# Patient Record
Sex: FEMALE | Race: WHITE | URBAN - METROPOLITAN AREA
[De-identification: names, ages, dates, MRNs, and addresses within clinical notes are randomized per-mention and may not be internally consistent; named-entity substitution may affect disease eponyms.]

---

## 2017-07-05 DIAGNOSIS — N18.5 CHRONIC KIDNEY DISEASE, STAGE V (H): Primary | ICD-10-CM

## 2017-07-10 ENCOUNTER — HOSPITAL ENCOUNTER (OUTPATIENT)
Dept: LAB | Facility: CLINIC | Age: 82
Discharge: HOME | End: 2017-07-10
Admitting: NURSE PRACTITIONER
Payer: MEDICARE

## 2017-07-10 DIAGNOSIS — N18.5 CHRONIC KIDNEY DISEASE, STAGE V (H): ICD-10-CM

## 2017-07-10 LAB
ALBUMIN SERPL-MCNC: 3.4 G/DL (ref 3.4–5)
ANION GAP SERPL CALCULATED.3IONS-SCNC: 8 MMOL/L (ref 3–14)
BUN SERPL-MCNC: 69 MG/DL (ref 7–30)
CALCIUM SERPL-MCNC: 9 MG/DL (ref 8.5–10.1)
CHLORIDE SERPL-SCNC: 104 MMOL/L (ref 94–109)
CO2 SERPL-SCNC: 24 MMOL/L (ref 20–32)
CREAT SERPL-MCNC: 4.13 MG/DL (ref 0.52–1.04)
ERYTHROCYTE [DISTWIDTH] IN BLOOD BY AUTOMATED COUNT: 14.3 % (ref 10–15)
GFR SERPL CREATININE-BSD FRML MDRD: 10 ML/MIN/1.7M2
GLUCOSE SERPL-MCNC: 71 MG/DL (ref 70–99)
HCT VFR BLD AUTO: 26.3 % (ref 35–47)
HGB BLD-MCNC: 8.4 G/DL (ref 11.7–15.7)
MCH RBC QN AUTO: 31.5 PG (ref 26.5–33)
MCHC RBC AUTO-ENTMCNC: 31.9 G/DL (ref 31.5–36.5)
MCV RBC AUTO: 99 FL (ref 78–100)
PHOSPHATE SERPL-MCNC: 4.4 MG/DL (ref 2.5–4.5)
PLATELET # BLD AUTO: 144 10E9/L (ref 150–450)
POTASSIUM SERPL-SCNC: 4 MMOL/L (ref 3.4–5.3)
RBC # BLD AUTO: 2.67 10E12/L (ref 3.8–5.2)
SODIUM SERPL-SCNC: 136 MMOL/L (ref 133–144)
WBC # BLD AUTO: 5.6 10E9/L (ref 4–11)

## 2017-07-10 PROCEDURE — 80069 RENAL FUNCTION PANEL: CPT | Performed by: NURSE PRACTITIONER

## 2017-07-10 PROCEDURE — 36415 COLL VENOUS BLD VENIPUNCTURE: CPT | Performed by: NURSE PRACTITIONER

## 2017-07-10 PROCEDURE — 85027 COMPLETE CBC AUTOMATED: CPT | Performed by: NURSE PRACTITIONER

## 2017-08-22 ENCOUNTER — HOSPITAL ENCOUNTER (OUTPATIENT)
Facility: CLINIC | Age: 82
Setting detail: OBSERVATION
Discharge: HOME | End: 2017-08-24
Attending: EMERGENCY MEDICINE | Admitting: INTERNAL MEDICINE
Payer: MEDICARE

## 2017-08-22 ENCOUNTER — APPOINTMENT (OUTPATIENT)
Dept: GENERAL RADIOLOGY | Facility: CLINIC | Age: 82
End: 2017-08-22
Attending: EMERGENCY MEDICINE
Payer: MEDICARE

## 2017-08-22 DIAGNOSIS — I48.91 ATRIAL FIBRILLATION WITH SLOW VENTRICULAR RESPONSE (H): ICD-10-CM

## 2017-08-22 DIAGNOSIS — N18.5 CKD (CHRONIC KIDNEY DISEASE) STAGE 5, GFR LESS THAN 15 ML/MIN (H): ICD-10-CM

## 2017-08-22 DIAGNOSIS — I10 BENIGN ESSENTIAL HYPERTENSION: ICD-10-CM

## 2017-08-22 DIAGNOSIS — R00.1 SYMPTOMATIC BRADYCARDIA: Primary | ICD-10-CM

## 2017-08-22 LAB
ALBUMIN SERPL-MCNC: 3.4 G/DL (ref 3.4–5)
ALP SERPL-CCNC: 85 U/L (ref 40–150)
ALT SERPL W P-5'-P-CCNC: 21 U/L (ref 0–50)
ANION GAP SERPL CALCULATED.3IONS-SCNC: 11 MMOL/L (ref 3–14)
ANION GAP SERPL CALCULATED.3IONS-SCNC: 11 MMOL/L (ref 6–17)
AST SERPL W P-5'-P-CCNC: 29 U/L (ref 0–45)
BASOPHILS # BLD AUTO: 0 10E9/L (ref 0–0.2)
BASOPHILS NFR BLD AUTO: 0.3 %
BILIRUB SERPL-MCNC: 0.3 MG/DL (ref 0.2–1.3)
BUN SERPL-MCNC: 51 MG/DL (ref 7–30)
BUN SERPL-MCNC: 55 MG/DL (ref 7–30)
CA-I BLD-SCNC: 4.9 MG/DL (ref 4.4–5.2)
CA-I SERPL ISE-MCNC: 4.9 MG/DL (ref 4.4–5.2)
CALCIUM SERPL-MCNC: 9.1 MG/DL (ref 8.5–10.1)
CHLORIDE BLD-SCNC: 101 MMOL/L (ref 94–109)
CHLORIDE SERPL-SCNC: 101 MMOL/L (ref 94–109)
CO2 BLD-SCNC: 22 MMOL/L (ref 20–32)
CO2 BLDCOV-SCNC: 22 MMOL/L (ref 21–28)
CO2 SERPL-SCNC: 24 MMOL/L (ref 20–32)
CREAT BLD-MCNC: 4.1 MG/DL (ref 0.52–1.04)
CREAT SERPL-MCNC: 3.88 MG/DL (ref 0.52–1.04)
DIFFERENTIAL METHOD BLD: ABNORMAL
EOSINOPHIL # BLD AUTO: 0.1 10E9/L (ref 0–0.7)
EOSINOPHIL NFR BLD AUTO: 1.8 %
ERYTHROCYTE [DISTWIDTH] IN BLOOD BY AUTOMATED COUNT: 14.6 % (ref 10–15)
GFR SERPL CREATININE-BSD FRML MDRD: 10 ML/MIN/1.7M2
GFR SERPL CREATININE-BSD FRML MDRD: 11 ML/MIN/1.7M2
GLUCOSE BLD-MCNC: 95 MG/DL (ref 70–99)
GLUCOSE SERPL-MCNC: 97 MG/DL (ref 70–99)
HCT VFR BLD AUTO: 30 % (ref 35–47)
HCT VFR BLD CALC: 33 %PCV (ref 35–47)
HGB BLD CALC-MCNC: 11.2 G/DL (ref 11.7–15.7)
HGB BLD-MCNC: 9.8 G/DL (ref 11.7–15.7)
IMM GRANULOCYTES # BLD: 0.1 10E9/L (ref 0–0.4)
IMM GRANULOCYTES NFR BLD: 0.8 %
LACTATE BLD-SCNC: 2 MMOL/L (ref 0.7–2.1)
LYMPHOCYTES # BLD AUTO: 2.7 10E9/L (ref 0.8–5.3)
LYMPHOCYTES NFR BLD AUTO: 39.9 %
MCH RBC QN AUTO: 32.3 PG (ref 26.5–33)
MCHC RBC AUTO-ENTMCNC: 32.7 G/DL (ref 31.5–36.5)
MCV RBC AUTO: 99 FL (ref 78–100)
MONOCYTES # BLD AUTO: 0.5 10E9/L (ref 0–1.3)
MONOCYTES NFR BLD AUTO: 8.1 %
NEUTROPHILS # BLD AUTO: 3.3 10E9/L (ref 1.6–8.3)
NEUTROPHILS NFR BLD AUTO: 48.1 %
NRBC # BLD AUTO: 0.1 10*3/UL
NRBC BLD AUTO-RTO: 1 /100
PCO2 BLDV: 37 MM HG (ref 40–50)
PH BLDV: 7.39 PH (ref 7.32–7.43)
PLATELET # BLD AUTO: 173 10E9/L (ref 150–450)
PO2 BLDV: 42 MM HG (ref 25–47)
POTASSIUM BLD-SCNC: 4.2 MMOL/L (ref 3.4–5.3)
POTASSIUM SERPL-SCNC: 4.2 MMOL/L (ref 3.4–5.3)
PROT SERPL-MCNC: 7.1 G/DL (ref 6.8–8.8)
RBC # BLD AUTO: 3.03 10E12/L (ref 3.8–5.2)
SAO2 % BLDV FROM PO2: 77 %
SODIUM BLD-SCNC: 134 MMOL/L (ref 133–144)
SODIUM SERPL-SCNC: 136 MMOL/L (ref 133–144)
TROPONIN I BLD-MCNC: 0.01 UG/L (ref 0–0.1)
TROPONIN I SERPL-MCNC: <0.015 UG/L (ref 0–0.04)
WBC # BLD AUTO: 6.6 10E9/L (ref 4–11)

## 2017-08-22 PROCEDURE — 80047 BASIC METABLC PNL IONIZED CA: CPT

## 2017-08-22 PROCEDURE — 84484 ASSAY OF TROPONIN QUANT: CPT | Performed by: EMERGENCY MEDICINE

## 2017-08-22 PROCEDURE — 25000128 H RX IP 250 OP 636: Performed by: EMERGENCY MEDICINE

## 2017-08-22 PROCEDURE — 99285 EMERGENCY DEPT VISIT HI MDM: CPT

## 2017-08-22 PROCEDURE — 85014 HEMATOCRIT: CPT

## 2017-08-22 PROCEDURE — 85025 COMPLETE CBC W/AUTO DIFF WBC: CPT | Performed by: EMERGENCY MEDICINE

## 2017-08-22 PROCEDURE — 84484 ASSAY OF TROPONIN QUANT: CPT | Mod: 91

## 2017-08-22 PROCEDURE — 96376 TX/PRO/DX INJ SAME DRUG ADON: CPT

## 2017-08-22 PROCEDURE — 71010 XR CHEST PORT 1 VW: CPT

## 2017-08-22 PROCEDURE — 93005 ELECTROCARDIOGRAM TRACING: CPT

## 2017-08-22 PROCEDURE — 99223 1ST HOSP IP/OBS HIGH 75: CPT | Mod: AI | Performed by: INTERNAL MEDICINE

## 2017-08-22 PROCEDURE — 83605 ASSAY OF LACTIC ACID: CPT

## 2017-08-22 PROCEDURE — 82803 BLOOD GASES ANY COMBINATION: CPT

## 2017-08-22 PROCEDURE — 82330 ASSAY OF CALCIUM: CPT | Performed by: EMERGENCY MEDICINE

## 2017-08-22 PROCEDURE — 96374 THER/PROPH/DIAG INJ IV PUSH: CPT

## 2017-08-22 PROCEDURE — 80053 COMPREHEN METABOLIC PANEL: CPT | Performed by: EMERGENCY MEDICINE

## 2017-08-22 PROCEDURE — 96375 TX/PRO/DX INJ NEW DRUG ADDON: CPT

## 2017-08-22 RX ORDER — ONDANSETRON 2 MG/ML
4 INJECTION INTRAMUSCULAR; INTRAVENOUS ONCE
Status: COMPLETED | OUTPATIENT
Start: 2017-08-22 | End: 2017-08-22

## 2017-08-22 RX ORDER — DILTIAZEM HYDROCHLORIDE 360 MG/1
360 CAPSULE, EXTENDED RELEASE ORAL DAILY
Status: ON HOLD | COMMUNITY
End: 2017-08-24

## 2017-08-22 RX ORDER — MULTIPLE VITAMINS W/ MINERALS TAB 9MG-400MCG
1 TAB ORAL DAILY
COMMUNITY

## 2017-08-22 RX ORDER — ATROPINE SULFATE 0.1 MG/ML
INJECTION INTRAVENOUS
Status: DISCONTINUED
Start: 2017-08-22 | End: 2017-08-23 | Stop reason: HOSPADM

## 2017-08-22 RX ADMIN — ONDANSETRON 4 MG: 2 INJECTION INTRAMUSCULAR; INTRAVENOUS at 22:26

## 2017-08-22 RX ADMIN — ATROPINE SULFATE 0.5 MG: 0.4 INJECTION, SOLUTION INTRAMUSCULAR; INTRAVENOUS; SUBCUTANEOUS at 20:35

## 2017-08-22 RX ADMIN — ONDANSETRON 4 MG: 2 INJECTION INTRAMUSCULAR; INTRAVENOUS at 20:16

## 2017-08-22 RX ADMIN — ATROPINE SULFATE 0.5 MG: 0.4 INJECTION, SOLUTION INTRAMUSCULAR; INTRAVENOUS; SUBCUTANEOUS at 20:10

## 2017-08-22 ASSESSMENT — ENCOUNTER SYMPTOMS
BACK PAIN: 0
VOMITING: 0
FATIGUE: 1
CONFUSION: 1
ABDOMINAL PAIN: 0
NAUSEA: 1
SHORTNESS OF BREATH: 1

## 2017-08-22 NOTE — Clinical Note
Admitting Physician: ROHIT WOLFF [[id number]]   [id number]   Bed Type: Cardiac Telemetry [44]   Special needs: Fall Risk [8]   Special needs: Recommend Lift [6]   Bed request comments: Tele unit bed requested @[id number]

## 2017-08-22 NOTE — IP AVS SNAPSHOT
[place] [address] [facility]    [address]    [facility] [address]    Phone:  [phone]    Fax:  [phone]                                       After Visit Summary   8/22/2017    Mary Boykin    MRN: [id number]           After Visit Summary Signature Page     I have received my discharge instructions, and my questions have been answered. I have discussed any challenges I see with this plan with the nurse or doctor.    ..........................................................................................................................................  Patient/Patient Representative Signature      ..........................................................................................................................................  Patient Representative Print Name and Relationship to Patient    ..................................................               ................................................  Date                                            Time    ..........................................................................................................................................  Reviewed by Signature/Title    ...................................................              ..............................................  Date                                                            Time

## 2017-08-22 NOTE — IP AVS SNAPSHOT
MRN:[id number]                      After Visit Summary   8/22/2017    Mary Boykin    MRN: [id number]           Thank you!     Thank you for choosing [facility] for your care. Our goal is always to provide you with excellent care. Hearing back from our patients is one way we can continue to improve our services. Please take a few minutes to complete the written survey that you may receive in the mail after you visit. If you would like to speak to someone directly about your visit please contact Patient Relations at [phone]. Thank you!          Patient Information     Date Of Birth          1935        Designated Caregiver       Most Recent Value    Caregiver    Will someone help with your care after discharge? yes    Name of designated caregiver Tammy    Phone number of caregiver [phone]    Caregiver address [address] [place] [address]      About your hospital stay     You were admitted on:  August 22, 2017 You last received care in the:  [facility]    You were discharged on:  August 24, 2017       Who to Call     For medical emergencies, please call 911.  For non-urgent questions about your medical care, please call your primary care provider or clinic, None          Attending Provider     Provider Specialty    Demetrice Quevedo MD Emergency Medicine    Oniel Doyle MD Internal Medicine    Vanessa Maurer MD Internal Medicine       Primary Care Provider    None Doctor, MD      After Care Instructions     Activity       Your activity upon discharge: activity as tolerated            Diet       Follow this diet upon discharge: Orders Placed This Encounter      Combination Diet Renal Diet                  Follow-up Appointments     Follow-up and recommended labs and tests        Follow up with primary care provider within 7 days for hospital follow- up.  The following labs/tests are recommended: bmp.                  Pending Results      "No orders found from 2017 to 2017.            Statement of Approval     Ordered          17 0957  I have reviewed and agree with all the recommendations and orders detailed in this document.  EFFECTIVE NOW     Approved and electronically signed by:  Vanessa Maurer MD             Admission Information     Date & Time Provider Department Dept. Phone    2017 Vanessa Maurer MD [place] [address] [facility] [phone]      Your Vitals Were     Blood Pressure Pulse Temperature Respirations Height Weight    160/68 (BP Location: Left arm) 68 97.7  F (36.5  C) (Oral) 16 1.6 m (5' 3\") 79.8 kg (176 lb)    Pulse Oximetry BMI (Body Mass Index)                95% 31.18 kg/m2          MyChart Information     nVoq lets you send messages to your doctor, view your test results, renew your prescriptions, schedule appointments and more. To sign up, go to [email or web address] . Click on \"Log in\" on the left side of the screen, which will take you to the Welcome page. Then click on \"Sign up Now\" on the right side of the page.     You will be asked to enter the access code listed below, as well as some personal information. Please follow the directions to create your username and password.     Your access code is: [email or web address]  Expires: 2017 10:04 AM     Your access code will  in 90 days. If you need help or a new code, please call your [facility] clinic or [phone].        Care EveryWhere ID     This is your Care EveryWhere ID. This could be used by other organizations to access your [facility] medical records  [id number]        Equal Access to Services     MONSE GILLIAM : Hadmo Osborn, isaiah mullen, ignacio parekh. So [facility] [phone].    ATENCIÓN: Si habla español, tiene a wyman disposición servicios gratuitos de asistencia lingüística. Llame al [phone].    We comply with applicable federal civil rights laws and [place] " laws. We do not discriminate on the basis of race, color, national origin, age, disability sex, sexual orientation or gender identity.               Review of your medicines      START taking        Dose / Directions    amLODIPine 5 MG tablet   Commonly known as:  NORVASC   Used for:  Benign essential hypertension        Dose:  5 mg   Start taking on:  8/25/2017   Take 1 tablet (5 mg) by mouth daily   Quantity:  30 tablet   Refills:  0       ondansetron 4 MG tablet   Commonly known as:  ZOFRAN        Dose:  4 mg   Take 1 tablet (4 mg) by mouth every 6 hours as needed for nausea   Quantity:  20 tablet   Refills:  0         CONTINUE these medicines which have NOT CHANGED        Dose / Directions    ALLOPURINOL PO        Dose:  100 mg   Take 100 mg by mouth daily   Refills:  0       DOXAZOSIN MESYLATE PO        Dose:  8 mg   Take 8 mg by mouth every evening   Refills:  0       HYDRALAZINE HCL PO        Dose:  10 mg   Take 10 mg by mouth 2 times daily   Refills:  0       LASIX PO        Dose:  40 mg   Take 40 mg by mouth 2 times daily   Refills:  0       Multi-vitamin Tabs tablet        Dose:  1 tablet   Take 1 tablet by mouth daily   Refills:  0       psyllium 0.52 G capsule        Dose:  1 capsule   Take 1 capsule by mouth daily   Refills:  0       ROSUVASTATIN CALCIUM PO        Dose:  10 mg   Take 10 mg by mouth every evening   Refills:  0       SYNTHROID PO        Dose:  25 mcg   Take 25 mcg by mouth daily   Refills:  0       VITAMIN D (CHOLECALCIFEROL) PO        Dose:  2000 Units   Take 2,000 Units by mouth daily   Refills:  0         STOP taking     CARDIZEM  MG 24 hr CD capsule   Generic drug:  diltiazem           METOPROLOL SUCCINATE ER PO                Where to get your medicines      These medications were sent to [facility]/pharmacy #[id number] - [place], [place] - [address] [facility]  [address] [facility] [address]     Phone:  [phone]     amLODIPine 5 MG tablet    ondansetron 4 MG tablet                 Protect others around you: Learn how to safely use, store and throw away your medicines at www.disposemymeds.org.             Medication List: This is a list of all your medications and when to take them. Check marks below indicate your daily home schedule. Keep this list as a reference.      Medications           Morning Afternoon Evening Bedtime As Needed    ALLOPURINOL PO   Take 100 mg by mouth daily   Last time this was given:  100 mg on 8/24/2017  8:23 AM                                amLODIPine 5 MG tablet   Commonly known as:  NORVASC   Take 1 tablet (5 mg) by mouth daily   Start taking on:  8/25/2017                                DOXAZOSIN MESYLATE PO   Take 8 mg by mouth every evening   Last time this was given:  8 mg on 8/23/2017  8:01 PM                                HYDRALAZINE HCL PO   Take 10 mg by mouth 2 times daily   Last time this was given:  10 mg on 8/24/2017  8:24 AM                                LASIX PO   Take 40 mg by mouth 2 times daily   Last time this was given:  40 mg on 8/24/2017  8:24 AM                                Multi-vitamin Tabs tablet   Take 1 tablet by mouth daily   Last time this was given:  1 tablet on 8/24/2017  8:24 AM                                ondansetron 4 MG tablet   Commonly known as:  ZOFRAN   Take 1 tablet (4 mg) by mouth every 6 hours as needed for nausea                                psyllium 0.52 G capsule   Take 1 capsule by mouth daily   Last time this was given:  0.52 g on 8/24/2017  8:24 AM                                ROSUVASTATIN CALCIUM PO   Take 10 mg by mouth every evening   Last time this was given:  10 mg on 8/23/2017  8:01 PM                                SYNTHROID PO   Take 25 mcg by mouth daily   Last time this was given:  25 mcg on 8/24/2017  8:24 AM                                VITAMIN D (CHOLECALCIFEROL) PO   Take 2,000 Units by mouth daily                                          More Information        Amlodipine tablets  What is  this medicine?  AMLODIPINE (janes lee peastrid) is a calcium-channel blocker. It affects the amount of calcium found in your heart and muscle cells. This relaxes your blood vessels, which can reduce the amount of work the heart has to do. This medicine is used to lower high blood pressure. It is also used to prevent chest pain.  How should I use this medicine?  Take this medicine by mouth with a glass of water. Follow the directions on the prescription label. Take your medicine at regular intervals. Do not take more medicine than directed.  Talk to your pediatrician regarding the use of this medicine in children. Special care may be needed. This medicine has been used in children as young as 6.  Persons over 65 years old may have a stronger reaction to this medicine and need smaller doses.  What side effects may I notice from receiving this medicine?  Side effects that you should report to your doctor or health care professional as soon as possible:    allergic reactions like skin rash, itching or hives, swelling of the face, lips, or tongue    breathing problems    changes in vision or hearing    chest pain    fast, irregular heartbeat    swelling of legs or ankles  Side effects that usually do not require medical attention (report to your doctor or health care professional if they continue or are bothersome):    dry mouth    facial flushing    nausea, vomiting    stomach gas, pain    tired, weak    trouble sleeping  What may interact with this medicine?    herbal or dietary supplements    local or general anesthetics    medicines for high blood pressure    medicines for prostate problems    rifampin  What if I miss a dose?  If you miss a dose, take it as soon as you can. If it is almost time for your next dose, take only that dose. Do not take double or extra doses.  Where should I keep my medicine?  Keep out of the reach of children.  Store at room temperature between 59 and 86 degrees F (15 and 30 degrees C).  Protect from light. Keep container tightly closed. Throw away any unused medicine after the expiration date.  What should I tell my health care provider before I take this medicine?  They need to know if you have any of these conditions:    heart problems like heart failure or aortic stenosis    liver disease    an unusual or allergic reaction to amlodipine, other medicines, foods, dyes, or preservatives    pregnant or trying to get pregnant    breast-feeding  What should I watch for while using this medicine?  Visit your doctor or health care professional for regular check ups. Check your blood pressure and pulse rate regularly. Ask your health care professional what your blood pressure and pulse rate should be, and when you should contact him or her.  This medicine may make you feel confused, dizzy or lightheaded. Do not drive, use machinery, or do anything that needs mental alertness until you know how this medicine affects you. To reduce the risk of dizzy or fainting spells, do not sit or stand up quickly, especially if you are an older patient. Avoid alcoholic drinks; they can make you more dizzy.  Do not suddenly stop taking amlodipine. Ask your doctor or health care professional how you can gradually reduce the dose.  NOTE:This sheet is a summary. It may not cover all possible information. If you have questions about this medicine, talk to your doctor, pharmacist, or health care provider. Copyright  2017 Gold Standard                Ondansetron oral dissolving tablet  What is this medicine?  ONDANSETRON (on DAN se cayden) is used to treat nausea and vomiting caused by chemotherapy. It is also used to prevent or treat nausea and vomiting after surgery.  How should I use this medicine?  These tablets are made to dissolve in the mouth. Do not try to push the tablet through the foil backing. With dry hands, peel away the foil backing and gently remove the tablet. Place the tablet in the mouth and allow it to  dissolve, then swallow. While you may take these tablets with water, it is not necessary to do so.  Talk to your pediatrician regarding the use of this medicine in children. Special care may be needed.  What side effects may I notice from receiving this medicine?  Side effects that you should report to your doctor or health care professional as soon as possible:    allergic reactions like skin rash, itching or hives, swelling of the face, lips, or tongue    breathing problems    confusion    dizziness    fast or irregular heartbeat    feeling faint or lightheaded, falls    fever and chills    loss of balance or coordination    seizures    sweating    swelling of the hands and feet    tightness in the chest    tremors    unusually weak or tired  Side effects that usually do not require medical attention (report to your doctor or health care professional if they continue or are bothersome):    constipation or diarrhea    headache  What may interact with this medicine?  Do not take this medicine with any of the following medications:    apomorphine    certain medicines for fungal infections like fluconazole, itraconazole, ketoconazole, posaconazole, voriconazole    cisapride    dofetilide    dronedarone    pimozide    thioridazine    ziprasidone  This medicine may also interact with the following medications:    carbamazepine    certain medicines for depression, anxiety, or psychotic disturbances    fentanyl    linezolid    MAOIs like Carbex, Eldepryl, Marplan, Nardil, and Parnate    methylene blue (injected into a vein)    other medicines that prolong the QT interval (cause an abnormal heart rhythm)    phenytoin    rifampicin    tramadol  What if I miss a dose?  If you miss a dose, take it as soon as you can. If it is almost time for your next dose, take only that dose. Do not take double or extra doses.  Where should I keep my medicine?  Keep out of the reach of children.  Store between 2 and 30 degrees C (36 and 86  degrees F). Throw away any unused medicine after the expiration date.  What should I tell my health care provider before I take this medicine?  They need to know if you have any of these conditions:    heart disease    history of irregular heartbeat    liver disease    low levels of magnesium or potassium in the blood    an unusual or allergic reaction to ondansetron, granisetron, other medicines, foods, dyes, or preservatives    pregnant or trying to get pregnant    breast-feeding  What should I watch for while using this medicine?  Check with your doctor or health care professional as soon as you can if you have any sign of an allergic reaction.  NOTE:This sheet is a summary. It may not cover all possible information. If you have questions about this medicine, talk to your doctor, pharmacist, or health care provider. Copyright  2017 Gold Standard

## 2017-08-23 PROBLEM — I48.20 CHRONIC ATRIAL FIBRILLATION (H): Status: ACTIVE | Noted: 2017-08-23

## 2017-08-23 PROBLEM — R00.1 BRADYCARDIA: Status: ACTIVE | Noted: 2017-08-23

## 2017-08-23 PROBLEM — I48.91 ATRIAL FIBRILLATION WITH SLOW VENTRICULAR RESPONSE (H): Status: ACTIVE | Noted: 2017-08-23

## 2017-08-23 PROBLEM — N18.5 CKD (CHRONIC KIDNEY DISEASE) STAGE 5, GFR LESS THAN 15 ML/MIN (H): Status: ACTIVE | Noted: 2017-08-23

## 2017-08-23 LAB
INTERPRETATION ECG - MUSE: NORMAL
INTERPRETATION ECG - MUSE: NORMAL
T4 FREE SERPL-MCNC: 0.99 NG/DL (ref 0.76–1.46)
TSH SERPL DL<=0.005 MIU/L-ACNC: 5.63 MU/L (ref 0.4–4)

## 2017-08-23 PROCEDURE — 84439 ASSAY OF FREE THYROXINE: CPT | Performed by: INTERNAL MEDICINE

## 2017-08-23 PROCEDURE — 84443 ASSAY THYROID STIM HORMONE: CPT | Performed by: INTERNAL MEDICINE

## 2017-08-23 PROCEDURE — 25000125 ZZHC RX 250: Performed by: INTERNAL MEDICINE

## 2017-08-23 PROCEDURE — A9270 NON-COVERED ITEM OR SERVICE: HCPCS | Mod: GY | Performed by: INTERNAL MEDICINE

## 2017-08-23 PROCEDURE — 99207 ZZC CDG-CODE CATEGORY CHANGED: CPT | Performed by: INTERNAL MEDICINE

## 2017-08-23 PROCEDURE — 36415 COLL VENOUS BLD VENIPUNCTURE: CPT | Performed by: INTERNAL MEDICINE

## 2017-08-23 PROCEDURE — 25000128 H RX IP 250 OP 636: Performed by: INTERNAL MEDICINE

## 2017-08-23 PROCEDURE — 25000132 ZZH RX MED GY IP 250 OP 250 PS 637: Mod: GY | Performed by: INTERNAL MEDICINE

## 2017-08-23 PROCEDURE — 99225 ZZC SUBSEQUENT OBSERVATION CARE,LEVEL II: CPT | Performed by: INTERNAL MEDICINE

## 2017-08-23 PROCEDURE — G0378 HOSPITAL OBSERVATION PER HR: HCPCS

## 2017-08-23 RX ORDER — ALLOPURINOL 100 MG/1
100 TABLET ORAL DAILY
Status: DISCONTINUED | OUTPATIENT
Start: 2017-08-23 | End: 2017-08-24 | Stop reason: HOSPADM

## 2017-08-23 RX ORDER — POLYETHYLENE GLYCOL 3350 17 G/17G
17 POWDER, FOR SOLUTION ORAL DAILY PRN
Status: DISCONTINUED | OUTPATIENT
Start: 2017-08-23 | End: 2017-08-24 | Stop reason: HOSPADM

## 2017-08-23 RX ORDER — ONDANSETRON 4 MG/1
4 TABLET, ORALLY DISINTEGRATING ORAL EVERY 6 HOURS PRN
Status: DISCONTINUED | OUTPATIENT
Start: 2017-08-23 | End: 2017-08-24 | Stop reason: HOSPADM

## 2017-08-23 RX ORDER — MULTIPLE VITAMINS W/ MINERALS TAB 9MG-400MCG
1 TAB ORAL DAILY
Status: DISCONTINUED | OUTPATIENT
Start: 2017-08-23 | End: 2017-08-24 | Stop reason: HOSPADM

## 2017-08-23 RX ORDER — ONDANSETRON 2 MG/ML
4 INJECTION INTRAMUSCULAR; INTRAVENOUS EVERY 6 HOURS PRN
Status: DISCONTINUED | OUTPATIENT
Start: 2017-08-23 | End: 2017-08-24 | Stop reason: HOSPADM

## 2017-08-23 RX ORDER — PROCHLORPERAZINE MALEATE 5 MG
5 TABLET ORAL EVERY 6 HOURS PRN
Status: DISCONTINUED | OUTPATIENT
Start: 2017-08-23 | End: 2017-08-24 | Stop reason: HOSPADM

## 2017-08-23 RX ORDER — AMOXICILLIN 250 MG
1-2 CAPSULE ORAL 2 TIMES DAILY PRN
Status: DISCONTINUED | OUTPATIENT
Start: 2017-08-23 | End: 2017-08-24 | Stop reason: HOSPADM

## 2017-08-23 RX ORDER — NALOXONE HYDROCHLORIDE 0.4 MG/ML
.1-.4 INJECTION, SOLUTION INTRAMUSCULAR; INTRAVENOUS; SUBCUTANEOUS
Status: DISCONTINUED | OUTPATIENT
Start: 2017-08-23 | End: 2017-08-23

## 2017-08-23 RX ORDER — BISACODYL 10 MG
10 SUPPOSITORY, RECTAL RECTAL DAILY PRN
Status: DISCONTINUED | OUTPATIENT
Start: 2017-08-23 | End: 2017-08-24 | Stop reason: HOSPADM

## 2017-08-23 RX ORDER — DOXAZOSIN 4 MG/1
8 TABLET ORAL EVERY EVENING
Status: DISCONTINUED | OUTPATIENT
Start: 2017-08-23 | End: 2017-08-24 | Stop reason: HOSPADM

## 2017-08-23 RX ORDER — HYDRALAZINE HYDROCHLORIDE 10 MG/1
10 TABLET, FILM COATED ORAL 2 TIMES DAILY
Status: DISCONTINUED | OUTPATIENT
Start: 2017-08-24 | End: 2017-08-24 | Stop reason: HOSPADM

## 2017-08-23 RX ORDER — NALOXONE HYDROCHLORIDE 0.4 MG/ML
.1-.4 INJECTION, SOLUTION INTRAMUSCULAR; INTRAVENOUS; SUBCUTANEOUS
Status: DISCONTINUED | OUTPATIENT
Start: 2017-08-23 | End: 2017-08-24 | Stop reason: HOSPADM

## 2017-08-23 RX ORDER — PROCHLORPERAZINE 25 MG
12.5 SUPPOSITORY, RECTAL RECTAL EVERY 12 HOURS PRN
Status: DISCONTINUED | OUTPATIENT
Start: 2017-08-23 | End: 2017-08-24 | Stop reason: HOSPADM

## 2017-08-23 RX ORDER — LEVOTHYROXINE SODIUM 25 UG/1
25 TABLET ORAL DAILY
Status: DISCONTINUED | OUTPATIENT
Start: 2017-08-23 | End: 2017-08-24 | Stop reason: HOSPADM

## 2017-08-23 RX ORDER — ROSUVASTATIN CALCIUM 5 MG/1
10 TABLET, COATED ORAL EVERY EVENING
Status: DISCONTINUED | OUTPATIENT
Start: 2017-08-23 | End: 2017-08-24 | Stop reason: HOSPADM

## 2017-08-23 RX ORDER — FUROSEMIDE 40 MG
40 TABLET ORAL
Status: DISCONTINUED | OUTPATIENT
Start: 2017-08-23 | End: 2017-08-24 | Stop reason: HOSPADM

## 2017-08-23 RX ORDER — ACETAMINOPHEN 325 MG/1
650 TABLET ORAL EVERY 4 HOURS PRN
Status: DISCONTINUED | OUTPATIENT
Start: 2017-08-23 | End: 2017-08-24 | Stop reason: HOSPADM

## 2017-08-23 RX ADMIN — ONDANSETRON 4 MG: 4 TABLET, ORALLY DISINTEGRATING ORAL at 12:36

## 2017-08-23 RX ADMIN — MULTIPLE VITAMINS W/ MINERALS TAB 1 TABLET: TAB at 08:01

## 2017-08-23 RX ADMIN — FUROSEMIDE 40 MG: 40 TABLET ORAL at 08:01

## 2017-08-23 RX ADMIN — LEVOTHYROXINE SODIUM 25 MCG: 25 TABLET ORAL at 08:02

## 2017-08-23 RX ADMIN — DOXAZOSIN 8 MG: 4 TABLET ORAL at 20:01

## 2017-08-23 RX ADMIN — PROCHLORPERAZINE EDISYLATE 5 MG: 5 INJECTION INTRAMUSCULAR; INTRAVENOUS at 00:31

## 2017-08-23 RX ADMIN — PSYLLIUM 0.52 G: 0.52 CAPSULE ORAL at 08:01

## 2017-08-23 RX ADMIN — FUROSEMIDE 40 MG: 40 TABLET ORAL at 16:20

## 2017-08-23 RX ADMIN — ROSUVASTATIN CALCIUM 10 MG: 5 TABLET, FILM COATED ORAL at 20:01

## 2017-08-23 RX ADMIN — ALLOPURINOL 100 MG: 100 TABLET ORAL at 08:01

## 2017-08-23 NOTE — ED PROVIDER NOTES
"  History     Chief Complaint:  Hypotension    HPI   Mary Boykin is a 81 year old female with a history of breast cancer and CKD who presents to the emergency department today for evaluation of hypotension. The patient's daughter reports she returned home from work today and checked the patient's blood pressure which was 99/43 with a pulse of 37. Per the patient's provider at [facility], if her systolic blood pressure drops below 100 to seek evaluation in the ED. Daughter notes the patient is confused and more lethargic today as well. Upon presentation, she endorses fatigue, nauseous, mild shortness of breath and her leg swelling is improved compared to her baseline. She is not on dialysis and \"will not be.\"  The patient is able to ambulate with a walker, but is unable to feel her legs after old spine surgery. She denies fevers, chest pain, vomiting, and any pain. She is DNR/DNI.     Allergies:  Atorvastatin     Medications:    ROSUVASTATIN CALCIUM PO  DOXAZOSIN MESYLATE PO  METOPROLOL SUCCINATE ER PO  HYDRALAZINE HCL PO    Past Medical History:    Heart failure  CKD  Spinal cord disease  Malignant neoplasm of breast  Hypertension  Anemia    Past Surgical History:    Simple mastectomy  Arthroplasty of knee  Phacoemulsification of cataract with intraocular lens implantation    Family History:    Cancer   Hyperlipidemia  Hypertension  Anemia  Arthritis    Social History:  The patient was accompanied to the ED by her family.  Smoking Status: Never  Smokeless Tobacco: Never  Alcohol Use: Yes  Marital Status:        Review of Systems   Constitutional: Positive for fatigue.   Respiratory: Positive for shortness of breath.    Cardiovascular: Positive for leg swelling. Negative for chest pain.   Gastrointestinal: Positive for nausea. Negative for abdominal pain and vomiting.   Musculoskeletal: Negative for back pain.   Psychiatric/Behavioral: Positive for confusion.   All other systems reviewed and are " "negative.    Physical Exam   First Vitals:  BP: 111/60  Pulse: (!) 42  Temp: 97.9  F (36.6  C)  Resp: 20  Height: 160 cm (5' 3\")  Weight: 77.1 kg (170 lb)  SpO2: 100 %      Physical Exam  Constitutional: Awake, fatigued appearing elderly woman, GCS 15, mild distress   HENT:    Nose: Nose normal.    Mouth/Throat: Oropharynx is clear, mucous membranes are moist   Eyes: Normal conjunctiva. Pupils are equal, round, and reactive to light.   CV: regular rate and rhythm; no murmurs, rubs or gallups  Chest: Effort normal and breath sounds normal.   GI:  There is no tenderness. No distension. Normal bowel sounds  MSK: Weakness in lower extremities, 3+ pitting edema bilaterally   Neurological: Alert, attentive, decreased sensation in legs (old postoperative injury), oriented x3  Skin: Skin is warm and dry.    Emergency Department Course     ECG:  Indication: Hypotension  Completed at 1949.  Read at 1950.   Atrial fibrillation with slow ventricular response with a competing junctional pacemaker  Nonspecific ST abnormality  Abnormal ECG   Rate 47 bpm. IL interval *. QRS duration 106. QT/QTc 516/456. P-R-T axes * 41 51.    Imaging:  Radiology findings were communicated with the patient and family who voiced understanding of the findings.  XR Chest Port 1 View   IMPRESSION:   1. No convincing evidence of active cardiopulmonary disease.  2. Relative prominence of the right hilum. This could relate to normal  pulmonary vessels, but an enlarged lymph node or a pulmonary mass  cannot be excluded. A nonportable two-view chest radiograph would be  useful for further evaluation when the patient is able.  Report per radiology     Laboratory:  Laboratory findings were communicated with the patient and family who voiced understanding of the findings.  Troponin (Collected 1950): <0.015  Troponin (Collected 2004): 0.01  Calcium ionized: 4.9  ISTAT gases lactate dayton POCT: pH Venous 7.39, PCO2 Venous 37(L), PO2 Venous 42, Bicarbonate Venous " 22, O2 Sat Venous 77, Lactic Acid 2.0  ISTAT Basic Met Ica HCT POCT: BUN 51(H), Creatinine 4.1(H), GFR Estimate 10(L), HGB 11.2(L), Hematocrit 33(L) o/w WNL  CBC: HGB 9.8(L) o/w WNL. (WBC 6.6,  )   CMP: Creatinine 3.88(H), BUN 55(H), GFR Estimate 11(L) o/w WNL     Interventions:   Atropine 0.5mg IV   Zofran 4mg IV   Atropine 0.5mg IV   Zofran 4mg IV     Emergency Department Course:  Nursing notes and vitals reviewed.  IV was inserted and blood was drawn for laboratory testing, results above.  The patient was sent for a XR Chest Port 1 View while in the emergency department, results above.   : I performed an exam of the patient as documented above.   : Patient rechecked and updated. I had a lengthy discussion with the patient and family regarding goals and treatment options.   :  I spoke with Dr. Doyle of the hospitalist service regarding patient's presentation, findings, and plan of care.  I discussed the treatment plan with the patient. They expressed understanding of this plan and consented to admission. I discussed the patient with Dr. Doyle, who will admit the patient to a monitored bed for further evaluation and treatment.  I personally reviewed the laboratory and imaging results with the Patient and spouse and daughter and answered all related questions prior to admission.    Impression & Plan      Medical Decision Makin year old female with CKD, presents with hypotension at home and fatigue.  Found to be bradycardic, with slow atrial fibrillation on EKG.  Differential diagnosis includes electrolyte abnormalities, medication side effect exacerbated by CKD, ingestion, CHF, ACS, pulmonary embolism.  She has not been hypotensive, but has had heart rate in 40-low 50s.  Improved temporarily with atropine.  Patient appears to have symptomatic bradycardia, has no history of atrial fibrillation, and her bradycardia may triggered by metoprolol build up with her CKD.  She is  DNR/DNI and told me she does not want any surgeries (including pacemaker placement) or to start dialysis.  Her goals are mostly for symptom treatment and medical management without large interventions.  I discussed multiple times what patient/family's goals of care are.  She and her  continued to state they would not want pacemaker and prefer to stay a [facility] for admission rather than transfer or ICU placement.  This appears to be in alignment with her wishes and family understands.  Patient is otherwise hemodynamically stable.  Troponin normal and lactate normal. Mild worsening of creatinine.  I discussed this with hospitalist who will admit the patient.      Diagnosis:    ICD-10-CM    1. Symptomatic bradycardia R00.1    2. Atrial fibrillation with slow ventricular response (H) I48.91    3. CKD (chronic kidney disease) stage 5, GFR less than 15 ml/min (H) N18.5      Disposition:  Admitted to cardiac telemetry    Scribe Disclosure:  Anna LE, am serving as a scribe at 7:46 PM on 8/22/2017 to document services personally performed by Demetrice Quevedo MD based on my observations and the provider's statements to me.     8/22/2017   [facility] EMERGENCY DEPARTMENT       Demetrice Quevedo MD  08/23/17 0016       Demetrice Quevedo MD  08/23/17 0018

## 2017-08-23 NOTE — ED NOTES
Patient states she has been feeling tired and weak for the last week since getting a shot at [place].  States they have talked dialysis for her kidney failure but she refuses to have that.  States she also has increased her Lasix lately and was told to be seen if her systolic was less than 100 and today it was.  Patient has moderate swelling to BLE's but states it looks better today and that it has been worse.  Noted to be bradycardic.  ABC's intact.  Placed on heart monitor, PIV placed, EKG obtained and BP cuff to be cycled every 15 minutes.  BP initially stable.  Does state intermittent shortness of breath.

## 2017-08-23 NOTE — PLAN OF CARE
HR merry 49-64, all other VSS.  A&Ox4, pivots with A2 and a walker.  Murmur present, LS expiratory wheezes.  Compazine given x1 for nausea/vomiting with good results.  Will continue with POC.

## 2017-08-23 NOTE — PHARMACY-ADMISSION MEDICATION HISTORY
Admission medication history interview status for this patient is complete. See [facility] admission navigator for allergy information, prior to admission medications and immunization status.     Medication history interview source(s):Patient and Family  Medication history resources (including written lists, pill bottles, clinic record):cellphone records    Changes made to PTA medication list:  Added: synthroid, allopurinol, lasix, MVI, Vitamin D, Metamucil, Diltiazem CD  Deleted: none  Changed: none    Actions taken by pharmacist (provider contacted, etc):called [facility] pharm [phone] to verify doses of synthroid, allopurinol, diltiazem     Additional medication history information:None    Medication reconciliation/reorder completed by provider prior to medication history? No    For patients on insulin therapy: no (Yes/No)   Lantus/levemir/NPH/Mix 70/30 dose: ___ in AM/PM or twice daily   Sliding scale Novolog Y/N   If Yes, do you have a baseline novolog pre-meal dose: ______units with meals   Patients eat three meals a day: Y/N ---  How many episodes of hypoglycemia (low blood glucose) do you have weekly: ---   How many missed doses do you have a week: ---  How many times do you check your blood glucose per day: ---  Any Barriers to therapy: cost of medications/comfortable with giving injections (if applicable)/ comfortable and confident with current diabetes regimen ---      Prior to Admission medications    Medication Sig Last Dose Taking? Auth Provider   ROSUVASTATIN CALCIUM PO Take 10 mg by mouth every evening  8/21/2017 at Unknown time Yes Reported, Patient   DOXAZOSIN MESYLATE PO Take 8 mg by mouth every evening  8/21/2017 at Unknown time Yes Reported, Patient   METOPROLOL SUCCINATE ER PO Take 25 mg by mouth daily 8/22/2017 at AM Yes Reported, Patient   HYDRALAZINE HCL PO Take 10 mg by mouth 2 times daily 8/22/2017 at AM Yes Reported, Patient   Levothyroxine Sodium (SYNTHROID PO) Take 25 mcg by mouth daily  8/22/2017 at AM Yes Unknown, Entered By History   ALLOPURINOL PO Take 100 mg by mouth daily 8/22/2017 at AM Yes Unknown, Entered By History   Furosemide (LASIX PO) Take 40 mg by mouth 2 times daily 8/22/2017 at 2nd-1300 Yes Unknown, Entered By History   multivitamin, therapeutic with minerals (MULTI-VITAMIN) TABS tablet Take 1 tablet by mouth daily 8/22/2017 at am Yes Unknown, Entered By History   VITAMIN D, CHOLECALCIFEROL, PO Take 2,000 Units by mouth daily 8/22/2017 at am Yes Unknown, Entered By History   psyllium 0.52 G capsule Take 1 capsule by mouth daily 8/22/2017 at am Yes Unknown, Entered By History   diltiazem (CARDIZEM CD) 360 MG 24 hr CD capsule Take 360 mg by mouth daily 8/22/2017 at am Yes Unknown, Entered By History

## 2017-08-23 NOTE — ED NOTES
Pt has renal failure, c/o feeling fatigued. Family checked BP at home, found to be low, also pulse in the 30's per family. Pt also c/o nausea, no emesis.

## 2017-08-23 NOTE — H&P
[facility]  History and Physical   Hospitalist Service    Oniel Doyle MD    Mary Boykin MRN# [id number]   YOB: 1935 Age: 81 year old      Date of Admission:  8/22/2017           Assessment and Plan:   Mary Boykin is a 81-year-old female with history of stage V chronic kidney disease.  She has decided not to pursue hemodialysis.  She also has history of breast cancer status post bilateral mastectomy, hypothyroidism, hypertension, anemia of chronic disease, spinal cord disease treated surgically (she now is wheelchair bound), hypercholesterolemia, and previous total knee arthroplasty.   She is DO NOT RESUSCITATE/DO NOT INTUBATE.  She follows at the [facility] for her chronic kidney disease.  Last week on Wednesday (5 days ago) she was at [facility] and received IV iron and erythropoietin.  Over the last several days she developed weakness, lethargy, and mild confusion.  Her daughter checked her blood pressure this evening and it was 90s systolic over 40s diastolic.  Heart rate was 37.   was brought to the emergency department for evaluation.  Emergency department evaluation showed bradycardia with heart rate in the 40s to 50s.  Blood pressure was adequate.  BUN and creatinine were 51 and 4.1 respectively  Baseline creatinine has recently been approximately 3.8, according to .  CBC was unremarkable.  Troponin was normal.  Lactic acid was normal at 2.  Electrolytes were unremarkable.  Chest x-ray was unremarkable.  was given two 0.5 mg doses of atropine without significant change in heart rate. The question of possible pacemaker for bradycardia was discussed with patient.  She stated that she would not want pacemaker placement.  I was asked to admit her with symptomatic bradycardia in the setting of diltiazem and metoprolol therapy for atrial fibrillation with worsening chronic kidney disease.    Problem list:    1.  Symptomatic bradycardia.  I suspect that this is due  to treatment with diltiazem and metoprolol in the setting of worsening progressive stage V chronic kidney disease.  If this does prove to be intrinsic bradycardia  has decided that she would not want pacemaker placement.  This is consistent with her decision to not pursue hemodialysis for chronic kidney disease.  She will be admitted as an inpatient.  Metoprolol and diltiazem will be held.  She will be watched on telemetry.    2.  Atrial fibrillation.  Patient is not anticoagulated.  Hold metoprolol and diltiazem as discussed above.    3.  Essential hypertension.  Continue Cardura, Hydralazine, and Lasix. Diltiazem and metoprolol will be held as discussed above.  Monitor blood pressure with the holding of these medications.    4.  Hypothyroidism.  Check TSH in the morning.    5.  Stage V chronic kidney disease.  As discussed above, patient has decided not to pursue hemodialysis.    6.  Anemia of chronic disease.  Status post IV iron and erythropoietin 5 days ago.    DO NOT RESUSCITATE/DO NOT INTUBATE.  Confirmed with patient and family  Mechanical DVT prophylaxis  Disposition: Admit as inpatient           Code Status:   DNR / DNI         Primary Care Physician:   Doctor, None None         Chief Complaint:   Weakness, lethargy, nausea, and mild confusion.    History is obtained from , her family, Dr. Quevedo, and the medical record.         History of Present Illness:   Mary Boykin is a 81-year-old female with history of stage V chronic kidney disease.  She has decided not to pursue hemodialysis.  She also has history of breast cancer status post bilateral mastectomy, hypothyroidism, hypertension, anemia of chronic disease, spinal cord disease treated surgically (she now is wheelchair bound), hypercholesterolemia, and previous total knee arthroplasty.   She is DO NOT RESUSCITATE/DO NOT INTUBATE.  She follows at the [facility] for her chronic kidney disease.  Last week on Wednesday (5 days ago) she was at [facility] and received IV iron and erythropoietin.  Over the last several days she developed weakness, lethargy, and mild confusion.  Her daughter checked her blood pressure this evening and it was 90s systolic over 40s diastolic.  Heart rate was 37.   was brought to the emergency department for evaluation.  Emergency department evaluation showed bradycardia with heart rate in the 40s to 50s.  Blood pressure was adequate.  BUN and creatinine were 51 and 4.1 respectively  Baseline creatinine has recently been approximately 3.8, according to .  CBC was unremarkable.  Troponin was normal.  Lactic acid was normal at 2.  Electrolytes were unremarkable.  Chest x-ray was unremarkable.  was given two 0.5 mg doses of atropine without significant change in heart rate. The question of possible pacemaker for bradycardia was discussed with patient.  She stated that she would not want pacemaker placement.  I was asked to admit her with symptomatic bradycardia in the setting of diltiazem and metoprolol therapy for atrial fibrillation with worsening chronic kidney disease.           Past Medical History:     Patient Active Problem List   Diagnosis     Bradycardia     Atrial fibrillation with slow ventricular response (H)     CKD (chronic kidney disease) stage 5, GFR less than 15 ml/min (H)      Past Medical History:   Diagnosis Date     Anemia      Cancer (H)     breast with bilateral masectomy     Chronic kidney disease              Past Surgical History:   History reviewed. No pertinent surgical history.         Home Medications:     Prior to Admission medications    Medication Sig Last Dose Taking? Auth Provider   ROSUVASTATIN CALCIUM PO Take 10 mg by mouth every evening  8/21/2017 at Unknown time Yes Reported, Patient   DOXAZOSIN MESYLATE PO Take 8 mg by mouth every evening  8/21/2017 at Unknown time Yes Reported, Patient   METOPROLOL SUCCINATE ER PO Take 25 mg by mouth daily 8/22/2017 at AM Yes Reported, Patient  "  HYDRALAZINE HCL PO Take 10 mg by mouth 2 times daily 8/22/2017 at AM Yes Reported, Patient   Levothyroxine Sodium (SYNTHROID PO) Take 25 mcg by mouth daily 8/22/2017 at AM Yes Unknown, Entered By History   ALLOPURINOL PO Take 100 mg by mouth daily 8/22/2017 at AM Yes Unknown, Entered By History   Furosemide (LASIX PO) Take 40 mg by mouth 2 times daily 8/22/2017 at 2nd-1300 Yes Unknown, Entered By History   multivitamin, therapeutic with minerals (MULTI-VITAMIN) TABS tablet Take 1 tablet by mouth daily 8/22/2017 at am Yes Unknown, Entered By History   VITAMIN D, CHOLECALCIFEROL, PO Take 2,000 Units by mouth daily 8/22/2017 at am Yes Unknown, Entered By History   psyllium 0.52 G capsule Take 1 capsule by mouth daily 8/22/2017 at am Yes Unknown, Entered By History   diltiazem (CARDIZEM CD) 360 MG 24 hr CD capsule Take 360 mg by mouth daily 8/22/2017 at am Yes Unknown, Entered By History            Allergies:     Allergies   Allergen Reactions     Atorvastatin             Social History:     Social History   Substance Use Topics     Smoking status: Never Smoker     Smokeless tobacco: Never Used     Alcohol use 4.2 oz/week     7 Shots of liquor per week             Family History:   Pancreatic cancer  Esophageal cancer           Review of Systems:   The 10 point Review of Systems is negative other than as noted in the HPI.           Physical Exam:   Blood pressure 130/66, pulse (!) 42, temperature 97.9  F (36.6  C), temperature source Oral, resp. rate 13, height 1.6 m (5' 3\"), weight 77.1 kg (170 lb), SpO2 95 %.  170 lbs 0 oz      GENERAL: Pleasant and cooperative. No acute distress.  EYES: Pupils equal and round. No scleral erythema or icterus.  ENT: External ears are normal without deformity. Posterior oropharynx is without erythem, swelling, or exudate.  NECK: Supple. No masses or swelling. No tenderness. Thyroid is normal without mass or tenderness.  CHEST: Clear to auscultation. Normal breath sounds. No " retractions.   CV: Regular rate and rhythm. No JVD. Pulses normal.  ABDOMEN: Bowel sounds present. No tenderness. No masses or hernia.  EXTREMETIES: No clubbing, cyanosis, or ischemia.  SKIN: Warm and dry to touch. No wounds or rashes.  NEUROLOGIC: Strength and sensation are normal. Deep tendon reflexes are normal. Cranial nerves are normal.             Data:   All new lab and imaging data was reviewed.     Results for orders placed or performed during the hospital encounter of 08/22/17 (from the past 24 hour(s))   EKG 12 lead   Result Value Ref Range    Interpretation ECG Click View Image link to view waveform and result    CBC with platelets differential   Result Value Ref Range    WBC 6.6 4.0 - 11.0 10e9/L    RBC Count 3.03 (L) 3.8 - 5.2 10e12/L    Hemoglobin 9.8 (L) 11.7 - 15.7 g/dL    Hematocrit 30.0 (L) 35.0 - 47.0 %    MCV 99 78 - 100 fl    MCH 32.3 26.5 - 33.0 pg    MCHC 32.7 31.5 - 36.5 g/dL    RDW 14.6 10.0 - 15.0 %    Platelet Count 173 150 - 450 10e9/L    Diff Method Automated Method     % Neutrophils 48.1 %    % Lymphocytes 39.9 %    % Monocytes 8.1 %    % Eosinophils 1.8 %    % Basophils 0.3 %    % Immature Granulocytes 0.8 %    Nucleated RBCs 1 (H) 0 /100    Absolute Neutrophil 3.3 1.6 - 8.3 10e9/L    Absolute Lymphocytes 2.7 0.8 - 5.3 10e9/L    Absolute Monocytes 0.5 0.0 - 1.3 10e9/L    Absolute Eosinophils 0.1 0.0 - 0.7 10e9/L    Absolute Basophils 0.0 0.0 - 0.2 10e9/L    Abs Immature Granulocytes 0.1 0 - 0.4 10e9/L    Absolute Nucleated RBC 0.1    Comprehensive metabolic panel   Result Value Ref Range    Sodium 136 133 - 144 mmol/L    Potassium 4.2 3.4 - 5.3 mmol/L    Chloride 101 94 - 109 mmol/L    Carbon Dioxide 24 20 - 32 mmol/L    Anion Gap 11 3 - 14 mmol/L    Glucose 97 70 - 99 mg/dL    Urea Nitrogen 55 (H) 7 - 30 mg/dL    Creatinine 3.88 (H) 0.52 - 1.04 mg/dL    GFR Estimate 11 (L) >60 mL/min/1.7m2    GFR Estimate If Black 14 (L) >60 mL/min/1.7m2    Calcium 9.1 8.5 - 10.1 mg/dL    Bilirubin  Total 0.3 0.2 - 1.3 mg/dL    Albumin 3.4 3.4 - 5.0 g/dL    Protein Total 7.1 6.8 - 8.8 g/dL    Alkaline Phosphatase 85 40 - 150 U/L    ALT 21 0 - 50 U/L    AST 29 0 - 45 U/L   Troponin I   Result Value Ref Range    Troponin I ES <0.015 0.000 - 0.045 ug/L   Calcium ionized   Result Value Ref Range    Calcium Ionized 4.9 4.4 - 5.2 mg/dL   ISTAT Basic Met ICa HCT POCT   Result Value Ref Range    Sodium 134 133 - 144 mmol/L    Potassium 4.2 3.4 - 5.3 mmol/L    Chloride 101 94 - 109 mmol/L    Total CO2 22 20 - 32 mmol/L    Anion Gap 11 6 - 17 mmol/L    Glucose 95 70 - 99 mg/dL    Urea Nitrogen 51 (H) 7 - 30 mg/dL    Creatinine 4.1 (H) 0.52 - 1.04 mg/dL    GFR Estimate 10 (L) >60 mL/min/1.7m2    GFR Estimate If Black 13 (L) >60 mL/min/1.7m2    Calcium Ionized 4.9 4.4 - 5.2 mg/dL    Hemoglobin 11.2 (L) 11.7 - 15.7 g/dL    Hematocrit - POCT 33 (L) 35.0 - 47.0 %PCV   ISTAT gases lactate [place] POCT   Result Value Ref Range    Ph Venous 7.39 7.32 - 7.43 pH    PCO2 Venous 37 (L) 40 - 50 mm Hg    PO2 Venous 42 25 - 47 mm Hg    Bicarbonate Venous 22 21 - 28 mmol/L    O2 Sat Venous 77 %    Lactic Acid 2.0 0.7 - 2.1 mmol/L   Troponin POCT   Result Value Ref Range    Troponin I 0.01 0.00 - 0.10 ug/L   XR Chest Port 1 View    Narrative    CHEST SINGLE VIEW PORTABLE   8/22/2017 8:34 PM     HISTORY: Shortness of breath. Hypotension.    COMPARISON: None.    FINDINGS: Hypoinflated lungs. The lungs are clear. Relative prominence  of the right hilum. Cardiomegaly. Atherosclerotic calcification in the  thoracic aorta.      Impression    IMPRESSION:   1. No convincing evidence of active cardiopulmonary disease.  2. Relative prominence of the right hilum. This could relate to normal  pulmonary vessels, but an enlarged lymph node or a pulmonary mass  cannot be excluded. A nonportable two-view chest radiograph would be  useful for further evaluation when the patient is able.    PINKY MATHEWS MD

## 2017-08-23 NOTE — PROGRESS NOTES
[facility]  Hospitalist Progress Note  Name: Mary Boykin    MRN: [id number]  YOB: 1935    Age: 81 year old  Date of admission: 8/22/2017  Primary care provider: Doctor, None      Reason for Stay (Diagnosis): symptomatic bradycardia         Assessment and Plan:      Summary of Stay:  Mary Boykin is a 81-year-old female with history of stage V chronic kidney disease.  She has decided not to pursue hemodialysis.  She also has history of breast cancer status post bilateral mastectomy, hypothyroidism, hypertension, anemia of chronic disease, spinal cord disease treated surgically (she now is wheelchair bound), hypercholesterolemia, and previous total knee arthroplasty.   She is DO NOT RESUSCITATE/DO NOT INTUBATE.  She follows at the [facility] for her chronic kidney disease.  Last week on Wednesday (5 days ago) she was at [facility] and received IV iron and erythropoietin.  Over the last several days she developed weakness, lethargy, and mild confusion.  Her daughter checked her blood pressure this evening and it was 90s systolic over 40s diastolic.  Heart rate was 37.   was brought to the emergency department for evaluation.  Emergency department evaluation showed bradycardia with heart rate in the 40s to 50s.  Blood pressure was adequate.  BUN and creatinine were 51 and 4.1 respectively  Baseline creatinine has recently been approximately 3.8, according to .  CBC was unremarkable.  Troponin was normal.  Lactic acid was normal at 2.  Electrolytes were unremarkable.  Chest x-ray was unremarkable.  was given two 0.5 mg doses of atropine without significant change in heart rate. The question of possible pacemaker for bradycardia was discussed with patient.  She stated that she would not want pacemaker placement.  We were asked to admit her with symptomatic bradycardia in the setting of diltiazem and metoprolol therapy for atrial fibrillation with worsening chronic  "kidney disease.    Problem List/Plan:  1. Symptomatic bradycardia: Heart rate improved with holding Toprol-XL and diltiazem.    Continue to monitor on telemetry with reintroduction of metoprolol tartrate instead at half dose of 12.5 mg p.o. b.i.d. tomorrow with parameters.  We'll plan on holding diltiazem at discharge.    2.  Atrial fibrillation: Heart rate currently controlled.  Not chronically on anticoagulation.    Restarting metoprolol 12.5 mg p.o. b.i.d. tomorrow with parameters    3.  Hypertension: Chronically on Cardura, hydralazine, diltiazem, metoprolol, and Lasix.    Holding diltiazem as above and will restart metoprolol tartrate tomorrow    4.  Stage V chronic kidney disease:  Creatinine baseline appears to be around 3.8.  Slightly worse on presentation and probably prerenal with hypotension and bradycardia.    Monitor for now    5.  Anemia of chronic disease secondary to #4: Status post IV iron and erythropoietin infusion five days ago with apparent side effect of lethargy, fatigue, and weakness    We'll discuss with the [facility] nephrology department to give them an update    6.  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    DVT Prophylaxis: Pneumatic Compression Devices  Code Status: DNR / DNI  Discharge Dispo: home  Estimated Disch Date / # of Days until Disch: likely tomorrow if hr stable    Family at the bedside and updated on poc  Time spent >35 minutes      Interval History (Subjective):      Feels better; no lightheadedness this am and able to transfer with         Physical Exam:      Vital signs:  Temp: 97.8  F (36.6  C) Temp src: Oral BP: 154/63 Pulse: (!) 42 Heart Rate: 64 Resp: 16 SpO2: 95 % O2 Device: None (Room air)   Height: 160 cm (5' 3\") Weight: 77.1 kg (170 lb)  Estimated body mass index is 30.11 kg/(m^2) as calculated from the following:    Height as of this encounter: 1.6 m (5' 3\").    Weight as of this encounter: 77.1 kg (170 lb).         Vitals:    08/22/17 1939   Weight: " 77.1 kg (170 lb)       Constitutional: Awake, alert, cooperative, no apparent distress   Respiratory: Nl work of breathing. Clear to auscultation bilaterally, no crackles or wheezing   Cardiovascular: Regular rate and rhythm, normal S1 and S2, and no murmur noted   Abdomen: Normal bowel sounds, soft, non-distended, non-tender   Skin: No rashes, no cyanosis, dry to touch   Neuro: CN 2-12 intact, no localizing weakness   Extremities: No edema, normal range of motion   HEENT Normocephalic, atraumatic, normal nasal turbinates; oropharynx clear   Neck Supple; nl inspection; trachea midline; no thryomegaly   Psychiatric: A+O x3. Normal affect          Medications:      All current medications were reviewed with changes reflected in problem list.         Data:      All new lab and imaging data was reviewed.   Labs:    Recent Labs  Lab 08/22/17 1958 08/22/17 1950   WBC  --  6.6   HGB 11.2* 9.8*   HCT  --  30.0*   MCV  --  99   PLT  --  173       Recent Labs  Lab 08/22/17 1958 08/22/17 1950    136   POTASSIUM 4.2 4.2   CHLORIDE 101 101   CO2  --  24   ANIONGAP 11 11   GLC 95 97   BUN 51* 55*   CR  --  3.88*   GFRESTIMATED 10* 11*   GFRESTBLACK 13* 14*   CLEVE  --  9.1   PROTTOTAL  --  7.1   ALBUMIN  --  3.4   BILITOTAL  --  0.3   ALKPHOS  --  85   AST  --  29   ALT  --  21      Imaging:   Recent Results (from the past 24 hour(s))   XR Chest Port 1 View    Narrative    CHEST SINGLE VIEW PORTABLE   8/22/2017 8:34 PM     HISTORY: Shortness of breath. Hypotension.    COMPARISON: None.    FINDINGS: Hypoinflated lungs. The lungs are clear. Relative prominence  of the right hilum. Cardiomegaly. Atherosclerotic calcification in the  thoracic aorta.      Impression    IMPRESSION:   1. No convincing evidence of active cardiopulmonary disease.  2. Relative prominence of the right hilum. This could relate to normal  pulmonary vessels, but an enlarged lymph node or a pulmonary mass  cannot be excluded. A nonportable two-view  chest radiograph would be  useful for further evaluation when the patient is able.    MD Vanessa QUEVEDO [phone]

## 2017-08-23 NOTE — ED NOTES
[facility]  ED Nurse Handoff Report    Mary Boykin is a 81 year old female w/stage 5 kidney disease comes in with complaint of generalized weakness and nausea.  Pt given atropine and zofran to manage sx.  Pt is alert and full 2x assist to move.  Prefers using a walker to transfer (cannot feel legs, but can move them when she can see her feet)    ED Chief complaint: Hypotension  . ED Diagnosis:   Final diagnoses:   Symptomatic bradycardia   Atrial fibrillation with slow ventricular response (H)   CKD (chronic kidney disease) stage 5, GFR less than 15 ml/min (H)     Allergies:   Allergies   Allergen Reactions     Atorvastatin        Code Status: DNR / DNI  Activity level - Baseline/Home:  Stand with Assist of 2. Activity Level - Current:   Stand with Assist of 2. Lift room needed: Yes. Bariatric: No   Needed: No   Isolation: No. Infection: Not Applicable.     Vital Signs:   Vitals:    08/22/17 2130 08/22/17 2145 08/22/17 2200 08/22/17 2215   BP: 121/58 116/57 109/56 111/54   Pulse:       Resp: 9 8 10 12   Temp:       TempSrc:       SpO2: 95%  92% 99%   Weight:       Height:           Cardiac Rhythm:  ,      Pain level:    Patient confused: No. Patient Falls Risk: Yes.   Elimination Status: Has voided   Patient Report - Initial Complaint: weakness. Focused Assessment: generalized weakness, nausea  Tests Performed: blood, xray Abnormal Results: symptomatic bradycardia  Treatments provided: atropine 1mg, zofran 8mg  Family Comments: at bedside  OBS brochure/video discussed/provided to patient:  No  ED Medications:   Medications   atropine 1 MG/10ML injection (  Not Given 8/22/17 2011)   atropine injection 0.5 mg (0.5 mg Intravenous Given 8/22/17 2010)   ondansetron (ZOFRAN) injection 4 mg (4 mg Intravenous Given 8/22/17 2016)   atropine injection 0.5 mg (0.5 mg Intravenous Given 8/22/17 2035)   ondansetron (ZOFRAN) injection 4 mg (4 mg Intravenous Given 8/22/17 2226)     Drips infusing:  No  For  the majority of the shift this patient was Green. Interventions performed were n/a.     Severe Sepsis OR Septic Shock Diagnosis Present: No      ED Nurse Name/Phone Number: Jonathan Seaver,   10:58 PM  RECEIVING UNIT ED HANDOFF REVIEW    Above ED Nurse Handoff Report was reviewed: yes  Reviewed by: Paula Webber on August 22, 2017 at 11:55 PM

## 2017-08-23 NOTE — PLAN OF CARE
Problem: Goal Outcome Summary  Goal: Goal Outcome Summary  Outcome: Improving  Pt A&Ox4, transfers to commode with assist of 2 and WW. Incontinent of bowel and bladder. IV is saline locked. On Tele, A Fib CVR. Nausea after lunch, relief with zofran ODT. Home medications sent home with pt family today. O2 95% RA, LS diminished. Plans to d/c back to home with  and dtr, restart metoprolol tomorrow.

## 2017-08-23 NOTE — UTILIZATION REVIEW
"Admission Status; Secondary Review Determination     Under the authority of the Utilization Management Committee, the utilization review process indicated a secondary review on the above patient.  The review outcome is based on review of the medical records, discussions with staff, and applying clinical experience noted on the date of the review.       (X) Observation Status Appropriate - This patient does not meet hospital inpatient criteria and is placed in observation status. If this patient's primary payer is Medicare and was admitted as an inpatient, Condition Code 44 should be used and patient status changed to \"observation.\"     RATIONALE FOR DETERMINATION     This is a 81 year old female who presents with symptomatic bradycadia thought to be due to her use of metoprolol and diltiazem.  This has improved with holding her medications.  She would not want invasive treatments such as pacemaker placement.    The severity of illness, intensity of service provided, expected LOS and risk for adverse outcome make the care appropriate for further observation; however, the patient doesn't meet criteria for hospital inpatient admission. Dr. Maurer was notified of this determination.    The information on this document is developed by the utilization review team in order for the business office to ensure compliance.  This only denotes the appropriateness of proper admission status and does not reflect the quality of care rendered.       The definitions of Inpatient Status and Observation Status used in making the determination above are those provided in the CMS Coverage Manual, Chapter 1 and Chapter 6, section 70.4.      Sincerely,     Pepe Anguiano MD  Utilization Review/ Case Management  [facility]   "

## 2017-08-24 VITALS
SYSTOLIC BLOOD PRESSURE: 160 MMHG | HEART RATE: 68 BPM | TEMPERATURE: 97.7 F | HEIGHT: 63 IN | OXYGEN SATURATION: 95 % | RESPIRATION RATE: 16 BRPM | DIASTOLIC BLOOD PRESSURE: 68 MMHG | WEIGHT: 176 LBS | BODY MASS INDEX: 31.18 KG/M2

## 2017-08-24 LAB
ANION GAP SERPL CALCULATED.3IONS-SCNC: 9 MMOL/L (ref 3–14)
BUN SERPL-MCNC: 57 MG/DL (ref 7–30)
CALCIUM SERPL-MCNC: 9.2 MG/DL (ref 8.5–10.1)
CHLORIDE SERPL-SCNC: 102 MMOL/L (ref 94–109)
CO2 SERPL-SCNC: 26 MMOL/L (ref 20–32)
CREAT SERPL-MCNC: 3.93 MG/DL (ref 0.52–1.04)
GFR SERPL CREATININE-BSD FRML MDRD: 11 ML/MIN/1.7M2
GLUCOSE SERPL-MCNC: 83 MG/DL (ref 70–99)
POTASSIUM SERPL-SCNC: 4.1 MMOL/L (ref 3.4–5.3)
SODIUM SERPL-SCNC: 137 MMOL/L (ref 133–144)

## 2017-08-24 PROCEDURE — 25000132 ZZH RX MED GY IP 250 OP 250 PS 637: Mod: GY | Performed by: INTERNAL MEDICINE

## 2017-08-24 PROCEDURE — 99207 ZZC CDG-CODE CATEGORY CHANGED: CPT | Performed by: INTERNAL MEDICINE

## 2017-08-24 PROCEDURE — A9270 NON-COVERED ITEM OR SERVICE: HCPCS | Mod: GY | Performed by: INTERNAL MEDICINE

## 2017-08-24 PROCEDURE — 99217 ZZC OBSERVATION CARE DISCHARGE: CPT | Performed by: INTERNAL MEDICINE

## 2017-08-24 PROCEDURE — G0378 HOSPITAL OBSERVATION PER HR: HCPCS

## 2017-08-24 PROCEDURE — 80048 BASIC METABOLIC PNL TOTAL CA: CPT | Performed by: INTERNAL MEDICINE

## 2017-08-24 PROCEDURE — 25000132 ZZH RX MED GY IP 250 OP 250 PS 637: Performed by: INTERNAL MEDICINE

## 2017-08-24 RX ORDER — AMLODIPINE BESYLATE 5 MG/1
5 TABLET ORAL DAILY
Status: DISCONTINUED | OUTPATIENT
Start: 2017-08-25 | End: 2017-08-24 | Stop reason: HOSPADM

## 2017-08-24 RX ORDER — AMLODIPINE BESYLATE 5 MG/1
5 TABLET ORAL DAILY
Qty: 30 TABLET | Refills: 0 | Status: SHIPPED | OUTPATIENT
Start: 2017-08-25

## 2017-08-24 RX ORDER — ONDANSETRON 4 MG/1
4 TABLET, FILM COATED ORAL EVERY 6 HOURS PRN
Qty: 20 TABLET | Refills: 0 | Status: SHIPPED | OUTPATIENT
Start: 2017-08-24

## 2017-08-24 RX ADMIN — ALLOPURINOL 100 MG: 100 TABLET ORAL at 08:23

## 2017-08-24 RX ADMIN — LEVOTHYROXINE SODIUM 25 MCG: 25 TABLET ORAL at 08:24

## 2017-08-24 RX ADMIN — HYDRALAZINE HYDROCHLORIDE 10 MG: 10 TABLET, FILM COATED ORAL at 08:24

## 2017-08-24 RX ADMIN — MULTIPLE VITAMINS W/ MINERALS TAB 1 TABLET: TAB at 08:24

## 2017-08-24 RX ADMIN — PSYLLIUM 0.52 G: 0.52 CAPSULE ORAL at 08:24

## 2017-08-24 RX ADMIN — METOPROLOL TARTRATE 12.5 MG: 25 TABLET ORAL at 08:24

## 2017-08-24 RX ADMIN — FUROSEMIDE 40 MG: 40 TABLET ORAL at 08:24

## 2017-08-24 NOTE — PLAN OF CARE
Problem: Discharge Planning  Goal: Discharge Planning (Adult, OB, Behavioral, Peds)  Outcome: Improving   Patient alert and oriented x 4. Incontinent of bladder. Assist of 2 with repositioning. VSS. Denies pain. +3 edema noted to bilateral LE. Lung sounds clear. Afib on tele

## 2017-08-24 NOTE — PLAN OF CARE
Problem: Discharge Planning  Goal: Discharge Planning (Adult, OB, Behavioral, Peds)  PRIMARY DIAGNOSIS: SYNCOPE/TIA  OUTPATIENT/OBSERVATION GOALS TO BE MET BEFORE DISCHARGE:  1. Orthostatic performed: N/A     2. Diagnostic testing complete & at baseline neurologic testing: Yes     3. Cleared by consultants (if involved): No     4. Interpretation of cardiac rhythm per telemetry tech: A-fib CVR     5. Tolerating adequate PO diet and medications: Yes     6. Return to near baseline physical activity or neurologic status: Yes     Discharge Planner Nurse   Safe discharge environment identified: Yes  Barriers to discharge: Yes       Entered by: Tierra Dc 08/23/2017 10:12 PM     Please review provider order for any additional goals.   Nurse to notify provider when observation goals have been met and patient is ready for discharge.     VSS. A&OX4. Tele A-fib CVR. Denies dizziness or light headedness with activity. Ambulates with assist of two and Varsha Steady. Pt states she's at her baseline for activity. Anticipates discharge tomorrow.

## 2017-08-24 NOTE — PLAN OF CARE
Pt A&Ox4, up with assist of 2 and WW to transfer, otherwise WC bound. IV removed without difficulty, pt tolerated well. Tele, A Fib CVR in the 60's. Denies any SOB, dizziness or lightheadedness. Denies nausea this morning. All discharge information and education reviewed with pt and , all questions answered to pt satisfaction. Pt discharging to home with  transporting. Wheeled to front entrance via WC by staff.     OBSERVATION patient END time: 1150

## 2017-08-24 NOTE — PLAN OF CARE
Problem: Discharge Planning  Goal: Discharge Planning (Adult, OB, Behavioral, Peds)  PRIMARY DIAGNOSIS: SYNCOPE/TIA  OUTPATIENT/OBSERVATION GOALS TO BE MET BEFORE DISCHARGE:  1. Orthostatic performed: N/A     2. Diagnostic testing complete & at baseline neurologic testing: Yes     3. Cleared by consultants (if involved): No     4. Interpretation of cardiac rhythm per telemetry tech: A-fib CVR     5. Tolerating adequate PO diet and medications: Yes     6. Return to near baseline physical activity or neurologic status: Yes     Discharge Planner Nurse   Safe discharge environment identified: Yes  Barriers to discharge: Yes       Entered by: Tierra Dc 08/23/2017 10:11 PM     Please review provider order for any additional goals.   Nurse to notify provider when observation goals have been met and patient is ready for discharge.

## 2017-08-26 NOTE — DISCHARGE SUMMARY
[facility]  Discharge Summary        Mary Boykin MRN# [id number]   YOB: 1935 Age: 81 year old     Date of Admission:  8/22/2017  Date of Discharge:  8/24/2017 11:56 AM  Admitting Physician:  Vanessa Maurer MD  Discharge Physician: Vanessa Maurer MD  Discharging Service: Hospitalist     Primary Provider: Doctor, None  Primary Care Physician Phone Number: None         Discharge Diagnoses/Problem Oriented Hospital Course (Providers):    Mary Boykin was admitted on 8/22/2017 by Vanessa Maurer MD and I would refer you to their history and physical.      Patient was seen and examined on the day of discharge    Discharge diagnoses:  1. Symptomatic bradycardia secondary to medications  2. Paroxysmal atrial fibrillation  3. Hypertension  4. Chronic kidney disease stage V  5. Anemia of chronic disease  6. Nonambulatory at baseline secondary to neuropathy    Hospital course:  Mary Boykin is a 81-year-old female with history of stage V chronic kidney disease.  She has decided not to pursue hemodialysis.  She also has history of breast cancer status post bilateral mastectomy, hypothyroidism, hypertension, anemia of chronic disease, spinal cord disease treated surgically (she now is wheelchair bound), hypercholesterolemia, and previous total knee arthroplasty.   She is DO NOT RESUSCITATE/DO NOT INTUBATE.  She follows at the [facility] for her chronic kidney disease.  Five days prior to admission, she was at [facility] and received IV iron and erythropoietin.  Over the last several days she developed weakness, lethargy, and mild confusion.  Patient family reports that this was her second infusion and she felt about the same after her initial one. Her daughter checked her blood pressure this evening and it was 90s systolic over 40s diastolic.  Heart rate was 37.   was brought to the emergency department for evaluation.  Emergency department evaluation showed bradycardia with heart rate in the 40s to 50s.  Blood  pressure was adequate.  BUN and creatinine were 51 and 4.1 respectively  Baseline creatinine has recently been approximately 3.8, according to her daughter.  CBC was unremarkable.  Troponin was normal.  Lactic acid was normal at 2.  Electrolytes were unremarkable.  Chest x-ray was unremarkable.  was given two 0.5 mg doses of atropine without significant change in heart rate. The question of possible pacemaker for bradycardia was discussed with patient.  She stated that she would not want pacemaker placement.  Patient was admitted and placed on telemetry.  Her Toprol-XL and diltiazem were held.  Her heart rate did improve with the cessation of these negative chronotropic.  We did give her a trial of p.o. metoprolol tartrate 12.5 mg ×1 but noted that patient does have a significant first-degree AV block and was in sinus rhythm.  In any event, her heart rate improved her symptoms resolved.  It was felt that it may be best if patient remains off her negative chronotropics.  In regards to her hypertension, Norvasc was added this admission instead.  I did try to contact the patient's nephrologist at the [facility] but was unsuccessful.      Disposition: Discharged to home with family.  Primary M.D. follow-up within one week.  Patient family is not sure they would want to continue iron infusions and erythropoietin injections as she feels poorly after them.           Pending Results:        Unresulted Labs Ordered in the Past 30 Days of this Admission     No orders found from 6/23/2017 to 8/23/2017.            Discharge Instructions and Follow-Up:      Follow-up Appointments     Follow-up and recommended labs and tests        Follow up with primary care provider within 7 days for hospital follow-   up.  The following labs/tests are recommended: bmp.                      Discharge Disposition:      Discharged to home         Discharge Medications:        Discharge Medication List as of 8/24/2017 11:42 AM      START  taking these medications    Details   amLODIPine (NORVASC) 5 MG tablet Take 1 tablet (5 mg) by mouth daily, Disp-30 tablet, R-0, E-Prescribe      ondansetron (ZOFRAN) 4 MG tablet Take 1 tablet (4 mg) by mouth every 6 hours as needed for nausea, Disp-20 tablet, R-0, E-Prescribe         CONTINUE these medications which have NOT CHANGED    Details   ROSUVASTATIN CALCIUM PO Take 10 mg by mouth every evening , Historical      DOXAZOSIN MESYLATE PO Take 8 mg by mouth every evening , Historical      HYDRALAZINE HCL PO Take 10 mg by mouth 2 times daily, Historical      Levothyroxine Sodium (SYNTHROID PO) Take 25 mcg by mouth daily, Historical      ALLOPURINOL PO Take 100 mg by mouth daily, Historical      Furosemide (LASIX PO) Take 40 mg by mouth 2 times daily, Historical      multivitamin, therapeutic with minerals (MULTI-VITAMIN) TABS tablet Take 1 tablet by mouth daily, Historical      VITAMIN D, CHOLECALCIFEROL, PO Take 2,000 Units by mouth daily, Historical      psyllium 0.52 G capsule Take 1 capsule by mouth daily, Historical         STOP taking these medications       METOPROLOL SUCCINATE ER PO Comments:   Reason for Stopping:         diltiazem (CARDIZEM CD) 360 MG 24 hr CD capsule Comments:   Reason for Stopping:                 Allergies:         Allergies   Allergen Reactions     Atorvastatin            Consultations This Hospital Stay:      No consultations were requested during this admission           Image Results From This Hospital Stay (For Non-EPIC Providers):        Results for orders placed or performed during the hospital encounter of 08/22/17   XR Chest Port 1 View    Narrative    CHEST SINGLE VIEW PORTABLE   8/22/2017 8:34 PM     HISTORY: Shortness of breath. Hypotension.    COMPARISON: None.    FINDINGS: Hypoinflated lungs. The lungs are clear. Relative prominence  of the right hilum. Cardiomegaly. Atherosclerotic calcification in the  thoracic aorta.      Impression    IMPRESSION:   1. No convincing  evidence of active cardiopulmonary disease.  2. Relative prominence of the right hilum. This could relate to normal  pulmonary vessels, but an enlarged lymph node or a pulmonary mass  cannot be excluded. A nonportable two-view chest radiograph would be  useful for further evaluation when the patient is able.    PINKY MATHEWS MD

## 2017-10-16 ENCOUNTER — TRANSFERRED RECORDS (OUTPATIENT)
Dept: HEALTH INFORMATION MANAGEMENT | Facility: CLINIC | Age: 82
End: 2017-10-16

## 2017-10-20 ENCOUNTER — HOSPITAL ENCOUNTER (EMERGENCY)
Facility: CLINIC | Age: 82
Discharge: HOME | End: 2017-10-20
Attending: EMERGENCY MEDICINE | Admitting: EMERGENCY MEDICINE
Payer: MEDICARE

## 2017-10-20 ENCOUNTER — APPOINTMENT (OUTPATIENT)
Dept: GENERAL RADIOLOGY | Facility: CLINIC | Age: 82
End: 2017-10-20
Attending: EMERGENCY MEDICINE
Payer: MEDICARE

## 2017-10-20 VITALS
WEIGHT: 175 LBS | RESPIRATION RATE: 16 BRPM | TEMPERATURE: 98.2 F | OXYGEN SATURATION: 96 % | BODY MASS INDEX: 32.2 KG/M2 | HEIGHT: 62 IN | DIASTOLIC BLOOD PRESSURE: 73 MMHG | SYSTOLIC BLOOD PRESSURE: 150 MMHG | HEART RATE: 72 BPM

## 2017-10-20 DIAGNOSIS — R05.9 COUGH: ICD-10-CM

## 2017-10-20 DIAGNOSIS — H10.31 ACUTE CONJUNCTIVITIS OF RIGHT EYE, UNSPECIFIED ACUTE CONJUNCTIVITIS TYPE: ICD-10-CM

## 2017-10-20 DIAGNOSIS — R30.0 DYSURIA: ICD-10-CM

## 2017-10-20 LAB
ALBUMIN SERPL-MCNC: 3.6 G/DL (ref 3.4–5)
ALBUMIN UR-MCNC: >499 MG/DL
ALP SERPL-CCNC: 83 U/L (ref 40–150)
ALT SERPL W P-5'-P-CCNC: 22 U/L (ref 0–50)
ANION GAP SERPL CALCULATED.3IONS-SCNC: 9 MMOL/L (ref 3–14)
APPEARANCE UR: ABNORMAL
AST SERPL W P-5'-P-CCNC: 27 U/L (ref 0–45)
BACTERIA #/AREA URNS HPF: ABNORMAL /HPF
BASOPHILS # BLD AUTO: 0 10E9/L (ref 0–0.2)
BASOPHILS NFR BLD AUTO: 0.4 %
BILIRUB SERPL-MCNC: 0.5 MG/DL (ref 0.2–1.3)
BILIRUB UR QL STRIP: NEGATIVE
BUN SERPL-MCNC: 78 MG/DL (ref 7–30)
CALCIUM SERPL-MCNC: 9.2 MG/DL (ref 8.5–10.1)
CHLORIDE SERPL-SCNC: 98 MMOL/L (ref 94–109)
CO2 SERPL-SCNC: 26 MMOL/L (ref 20–32)
COLOR UR AUTO: ABNORMAL
CREAT SERPL-MCNC: 4.17 MG/DL (ref 0.52–1.04)
DIFFERENTIAL METHOD BLD: ABNORMAL
EOSINOPHIL # BLD AUTO: 0.1 10E9/L (ref 0–0.7)
EOSINOPHIL NFR BLD AUTO: 1.2 %
ERYTHROCYTE [DISTWIDTH] IN BLOOD BY AUTOMATED COUNT: 14.6 % (ref 10–15)
GFR SERPL CREATININE-BSD FRML MDRD: 10 ML/MIN/1.7M2
GLUCOSE SERPL-MCNC: 100 MG/DL (ref 70–99)
GLUCOSE UR STRIP-MCNC: NEGATIVE MG/DL
HCT VFR BLD AUTO: 32.3 % (ref 35–47)
HGB BLD-MCNC: 10.5 G/DL (ref 11.7–15.7)
HGB UR QL STRIP: ABNORMAL
IMM GRANULOCYTES # BLD: 0.1 10E9/L (ref 0–0.4)
IMM GRANULOCYTES NFR BLD: 0.8 %
KETONES UR STRIP-MCNC: NEGATIVE MG/DL
LEUKOCYTE ESTERASE UR QL STRIP: ABNORMAL
LYMPHOCYTES # BLD AUTO: 1.5 10E9/L (ref 0.8–5.3)
LYMPHOCYTES NFR BLD AUTO: 20.1 %
MCH RBC QN AUTO: 31.3 PG (ref 26.5–33)
MCHC RBC AUTO-ENTMCNC: 32.5 G/DL (ref 31.5–36.5)
MCV RBC AUTO: 96 FL (ref 78–100)
MONOCYTES # BLD AUTO: 0.5 10E9/L (ref 0–1.3)
MONOCYTES NFR BLD AUTO: 7.1 %
MUCOUS THREADS #/AREA URNS LPF: PRESENT /LPF
NEUTROPHILS # BLD AUTO: 5.3 10E9/L (ref 1.6–8.3)
NEUTROPHILS NFR BLD AUTO: 70.4 %
NITRATE UR QL: NEGATIVE
NRBC # BLD AUTO: 0 10*3/UL
NRBC BLD AUTO-RTO: 0 /100
PH UR STRIP: 8 PH (ref 5–7)
PLATELET # BLD AUTO: 136 10E9/L (ref 150–450)
POTASSIUM SERPL-SCNC: 3.5 MMOL/L (ref 3.4–5.3)
PROT SERPL-MCNC: 7.9 G/DL (ref 6.8–8.8)
RBC # BLD AUTO: 3.35 10E12/L (ref 3.8–5.2)
RBC #/AREA URNS AUTO: 24 /HPF (ref 0–2)
SODIUM SERPL-SCNC: 133 MMOL/L (ref 133–144)
SOURCE: ABNORMAL
SP GR UR STRIP: 1.01 (ref 1–1.03)
SQUAMOUS #/AREA URNS AUTO: 11 /HPF (ref 0–1)
URATE CRY #/AREA URNS HPF: ABNORMAL /HPF
UROBILINOGEN UR STRIP-MCNC: 0 MG/DL (ref 0–2)
WBC # BLD AUTO: 7.5 10E9/L (ref 4–11)
WBC #/AREA URNS AUTO: >182 /HPF (ref 0–2)
WBC CLUMPS #/AREA URNS HPF: PRESENT /HPF

## 2017-10-20 PROCEDURE — 87086 URINE CULTURE/COLONY COUNT: CPT | Performed by: EMERGENCY MEDICINE

## 2017-10-20 PROCEDURE — 25000132 ZZH RX MED GY IP 250 OP 250 PS 637: Mod: GY | Performed by: EMERGENCY MEDICINE

## 2017-10-20 PROCEDURE — 87186 SC STD MICRODIL/AGAR DIL: CPT | Performed by: EMERGENCY MEDICINE

## 2017-10-20 PROCEDURE — 85025 COMPLETE CBC W/AUTO DIFF WBC: CPT | Performed by: EMERGENCY MEDICINE

## 2017-10-20 PROCEDURE — A9270 NON-COVERED ITEM OR SERVICE: HCPCS | Mod: GY | Performed by: EMERGENCY MEDICINE

## 2017-10-20 PROCEDURE — 81001 URINALYSIS AUTO W/SCOPE: CPT | Performed by: EMERGENCY MEDICINE

## 2017-10-20 PROCEDURE — 93005 ELECTROCARDIOGRAM TRACING: CPT

## 2017-10-20 PROCEDURE — 87088 URINE BACTERIA CULTURE: CPT | Performed by: EMERGENCY MEDICINE

## 2017-10-20 PROCEDURE — 80053 COMPREHEN METABOLIC PANEL: CPT | Performed by: EMERGENCY MEDICINE

## 2017-10-20 PROCEDURE — 71020 XR CHEST 2 VW: CPT

## 2017-10-20 PROCEDURE — 99285 EMERGENCY DEPT VISIT HI MDM: CPT | Mod: 25

## 2017-10-20 RX ORDER — CEPHALEXIN 500 MG/1
500 CAPSULE ORAL ONCE
Status: COMPLETED | OUTPATIENT
Start: 2017-10-20 | End: 2017-10-20

## 2017-10-20 RX ORDER — OFLOXACIN 3 MG/ML
1 SOLUTION/ DROPS OPHTHALMIC EVERY 4 HOURS
Qty: 1 BOTTLE | Refills: 0 | Status: SHIPPED | OUTPATIENT
Start: 2017-10-20

## 2017-10-20 RX ADMIN — CEPHALEXIN 500 MG: 500 CAPSULE ORAL at 15:19

## 2017-10-20 ASSESSMENT — ENCOUNTER SYMPTOMS
NAUSEA: 1
VOMITING: 1
FEVER: 0
COUGH: 1

## 2017-10-20 NOTE — ED AVS SNAPSHOT
[facility]    [address]    [facility] [address]    Phone:  [phone]    Fax:  [phone]                                       Mary Boykin   MRN: [id number]    Department:  [facility]   Date of Visit:  10/20/2017           After Visit Summary Signature Page     I have received my discharge instructions, and my questions have been answered. I have discussed any challenges I see with this plan with the nurse or doctor.    ..........................................................................................................................................  Patient/Patient Representative Signature      ..........................................................................................................................................  Patient Representative Print Name and Relationship to Patient    ..................................................               ................................................  Date                                            Time    ..........................................................................................................................................  Reviewed by Signature/Title    ...................................................              ..............................................  Date                                                            Time

## 2017-10-20 NOTE — ED PROVIDER NOTES
"  History     Chief Complaint:  URI      HPI   Mary Boykin is a 82 year old female with a history of kidney failure who presents to the emergency department for evaluation of a URI. The patient says that she has been feeling sick since Monday and her symptoms have only gotten worse. She reports trouble sleeping due to the fact that she cant lay down because laying down makes her cough. She says this cough is a productive one with clear sputum. She also complains of nausea and vomiting and says that she was having diarrhea but that it has resolved. She denies any fevers. Of note the patients labs at [place] are significant for a BUN level of 76, a creatinine of 4.1. These labs were done 10/16/17.    Allergies:  Atorvastatin    Medications:    amlodipine (NORVASC) 5 MG tablet  ondansetron (ZOFRAN) 4 MG tablet  ROSUVASTATIN CALCIUM PO  DOXAZOSIN MESYLATE PO  HYDRALAZINE HCL PO  Levothyroxine Sodium (SYNTHROID PO)  ALLOPURINOL PO  Furosemide (LASIX PO)  multivitamin, therapeutic with minerals (MULTI-VITAMIN) TABS tablet  psyllium 0.52 G capsule    Past Medical History:    Anemia  Cancer  CKD    Past Surgical History:    Past surgical history reviewed. No pertinent history.     Family History:    History reviewed. No pertinent family history.     Social History:  Marital Status:   [2]  Social History   Substance Use Topics     Smoking status: Never Smoker     Smokeless tobacco: Never Used     Alcohol use 4.2 oz/week     7 Shots of liquor per week        Review of Systems   Constitutional: Negative for fever.   Respiratory: Positive for cough.    Gastrointestinal: Positive for nausea and vomiting.   All other systems reviewed and are negative.        Physical Exam   First Vitals:  BP: 140/65  Pulse: 72  Temp: 98.2  F (36.8  C)  Resp: 16  Height: 157.5 cm (5' 2\")  Weight: 79.4 kg (175 lb)  SpO2: 96 %      Physical Exam   Constitutional: She is oriented to person, place, and time.   Elderly frail   HENT:   Head: " Normocephalic and atraumatic.   Right Ear: External ear normal.   Mouth/Throat: Oropharynx is clear and moist.   Eyes: EOM and lids are normal. Pupils are equal, round, and reactive to light. Right conjunctiva is injected.       Neck: Normal range of motion. Neck supple. No JVD present.   Cardiovascular: Normal rate, regular rhythm and normal heart sounds.    Pulmonary/Chest: Effort normal and breath sounds normal.   Abdominal: Soft. Bowel sounds are normal. She exhibits no distension. There is no tenderness. There is no rebound.   Musculoskeletal: Normal range of motion.   Lymphadenopathy:     She has no cervical adenopathy.   Neurological: She is alert and oriented to person, place, and time. She displays normal reflexes. No cranial nerve deficit. She exhibits normal muscle tone. Coordination normal.   Skin: Skin is warm and dry.   Psychiatric: She has a normal mood and affect. Her behavior is normal. Judgment normal.     Emergency Department Course     ECG:  ECG taken at 1208, ECG read at 1210  Sinus rhythm with first degree AV block  Prolonged QT  Abnormal ECG  Rate 74 bpm. AZ interval 226 ms. QRS duration 116 ms. QT/QTc 450/499 ms. P-R-T axes 47 25 51.    Imaging:  Radiology findings were communicated with the patient who voiced understanding of the findings.    XR Chest 2 Views  Final Result  IMPRESSION: Possible left lower lobe pneumonia.    MARIXA PATIÑO MD  Reading per radiology     Laboratory:  Laboratory findings were communicated with the patient who voiced understanding of the findings.    Ua wit microscopic: protein albumin >499 (H), pH 8 (H), small blood (A), RBC/HPF 24 (H), WBC/HPF >182 (H), leuk esterase large (A), WBC clumps present (A), moderate bacteria (A), squamous epithelial/HPF 11 (H), mucous present (A), uric acid crystals few (A)  Urine culture: pending  CBC: WBC 7.5, HGB 10.5(L), (L)  CMP: glucose 100 (H), BUN 78 (H), GFR 10 (L), o/w WNL (Creatinine 4.17 (H))    Interventions:  1519  Keflex 500 mg PO    Emergency Department Course:  Nursing notes and vitals reviewed.  I performed an exam of the patient as documented above.   I discussed the treatment plan with the patient. They expressed understanding of this plan and consented to discharge. They will be discharged home with instructions for care and follow up. In addition, the patient will return to the emergency department if their symptoms persist, worsen, if new symptoms arise or if there is any concern.  All questions were answered.     I personally reviewed the lab and imaging results with the patient and answered all related questions prior to discharge.  Impression & Plan      Medical Decision Making:  Patient presents with feeling weak and a cough. Patient is chronically ill appearing. Her labs tests show chronic renal failure that has no acute change form earlier this week. Patient is normally followed at [place]. Patient has a blowing murmur on exam though the patient echo is not available to me. Patients symptoms sound more infectious, I suspect viral to the presenting right eye conjunctivitis as well as cough. Cath urinalysis shows quite a bit of WBCs. This could be sediment and will recommend oral antibiotics for UTI and topical antibiotics for conjunctivitis pending urine culture. Patient is encouraged to follow up and return with worsening condition.     Diagnosis:    ICD-10-CM    1. Dysuria R30.0    2. Acute conjunctivitis of right eye, unspecified acute conjunctivitis type H10.31    3. Cough R05      Disposition:   Discharged    Discharge Medications:  New Prescriptions    CEPHALEXIN (KEFLEX) 250 MG CAPSULE    Take 1 capsule (250 mg) by mouth 3 times daily for 5 days    NAPHAZOLINE-PHENIRAMINE (OPCON-A/VISINE A/NAPHCON A) SOLN OPHTHALMIC SOLUTION    Place 1 drop into the right eye 4 times daily as needed for irritation    OFLOXACIN (OCUFLOX) 0.3 % OPHTHALMIC SOLUTION    Apply 1 drop to eye every 4 hours       Scribe  Disclosure:  I, Clinton Blackburn, am serving as a scribe at 2:27 PM on 10/20/2017 to document services personally performed by Damon Berrios MD, based on my observations and the provider's statements to me.     [facility] EMERGENCY DEPARTMENT       Damon Berrios MD  10/24/17 [id number]

## 2017-10-20 NOTE — DISCHARGE INSTRUCTIONS
What Is Conjunctivitis?    Conjunctivitis is an irritation or infection. It affects the membrane that covers the white of your eye and the inside of your eyelid (conjunctiva). It can happen to one or both eyes. The membrane swells and the blood vessels enlarge (dilate). This makes your eye red. That's why conjunctivitis is sometimes called red eye or pink eye.  What are the symptoms?  If you have one or more of these symptoms, see an eye doctor:    Redness in and around your eye    Eyes that are puffy and sore    Itching, burning, or stinging eyes    Watery eyes or discharge from your eye    Eyelids that are crusty or stuck together when you wake up in the morning    Pink color in the whites of one or both eyes  Getting treatment quickly can help prevent damage to your eyes.  How is it diagnosed?  Conjunctivitis is usually a minor eye infection. But it can sometimes become a more serious problem. Some more serious eye diseases have symptoms that look like conjunctivitis. So it's important for an eye doctor to diagnose you. Your eye doctor will ask about your symptoms and any medicines you take. He or she will ask about any illnesses or medical conditions you may have. The doctor will also check your eyes with a hand-held light and a special microscope called a slit lamp.  Date Last Reviewed: 6/11/2015 2000-2017 [employer]. [address] [place] [address], [place] [id number]. All rights reserved. This information is not intended as a substitute for professional medical care. Always follow your healthcare professional's instructions.      Discharge Instructions  Urinary Tract Infection  You have urinary tract infection, or UTI. The urinary tract includes the kidneys (which make urine), ureters (the tubes that carry urine from the kidneys to the bladder), the bladder (which stores urine), and urethra (the tube that carries urine out of the bladder).  Urinary tract infections occur when bacteria travel up the  urethra into the bladder. We suspect a UTI based on chemical and microscopic findings in your urine, but if there is a question about your findings, we will do a culture to see if bacteria grow. A urine culture takes several days. You should always follow-up with your primary physician to find out about results of your culture if one was done.   Return to the Emergency Department if:    You have severe back pain.    You are vomiting so that you can t take your medicine, or have signs of dehydration (such as urinating less than 3 times per day).    You have fever over 101.5 degrees F.    You have significant confusion or are very weak, or feel very ill.    Your child seems much more ill, won t wake up, won t respond right, or is crying for a long time and won t calm down.    Your child is showing signs of dehydration, Signs of dehydration can be:  o Your infant has had no wet diapers in 4-5 hours.  o Your older child has not passed urine in 6-8 hours.  o Your infant or child starts to have dry mouth and lips, or no saliva or tears.    Follow-up with your doctor:     Children under 24 months need to be seen by their regular doctor within one week after a diagnosis of a UTI. It may be necessary to do some imaging tests to look at the child s kidney or bladder.    You should begin to feel better within 24 - 48 hours of starting your antibiotic.  If you do not, you need to be seen again.      Treatment:     You will be treated with an antibiotic to kill the bacteria. We have to make an educated guess as to which antibiotic will work for your infection. In most healthy people, we can guess right almost all of the time. Sometimes a culture is done to show which antibiotics will work. This usually takes 2-3 days. When the culture is done, we may have to contact you to put you on a different antibiotic.    Take a pain medication such as Tylenol  (acetaminophen), Advil  (ibuprofen), Nuprin  (ibuprofen), or Aleve  (naproxen).  "If you have been given a narcotic such as Vicodin  (hydrocodone with acetaminophen), Percocet  (oxycodone with acetaminophen), or codeine, do not drive for four hours after you have taken it. If the narcotic contains Tylenol  (acetaminophen), do not take Tylenol  with it. All narcotics will cause constipation, so eat a high fiber diet.      Pyridium  (phenazopyridine) or Uristat  (phenazopyridine) is a prescription medication that numbs the bladder to reduce the burning pain of some UTIs.  The same medication is available in a non-prescription version called Azo-Standard  (phenazopyridine), Urodol  (phenazopyridine), or other brand names. This medication will change the color of the urine and tears (usually blue or orange). If you wear contacts, do not wear them while taking this medication as they may be stained by the medication.    Antibiotic Warning:     If you have been placed on antibiotics - watch for signs of allergic reaction.  These include rash, lip swelling, difficulty breathing, wheezing, and dizziness.  If you develop any of these symptoms, stop the antibiotic immediately and go to an emergency room or urgent care for evaluation.    Probiotics: If you have been given an antibiotic, you may want to also take a probiotic pill or eat yogurt with live cultures. Probiotics have \"good bacteria\" to help your intestines stay healthy. Studies have shown that probiotics help prevent diarrhea and other intestine problems (including C. diff infection) when you take antibiotics. You can buy these without a prescription in the pharmacy section of the store.   If you were given a prescription for medicine here today, be sure to read all of the information (including the package insert) that comes with your prescription.  This will include important information about the medicine, its side effects, and any warnings that you need to know about.  The pharmacist who fills the prescription can provide more information and " answer questions you may have about the medicine.  If you have questions or concerns that the pharmacist cannot address, please call or return to the Emergency Department.   Opioid Medication Information    Pain medications are among the most commonly prescribed medicines, so we are including this information for all our patients. If you did not receive pain medication or get a prescription for pain medicine, you can ignore it.     You may have been given a prescription for an opioid (narcotic) pain medicine and/or have received a pain medicine while here in the Emergency Department. These medicines can make you drowsy or impaired. You must not drive, operate dangerous equipment, or engage in any other dangerous activities while taking these medications. If you drive while taking these medications, you could be arrested for DUI, or driving under the influence. Do not drink any alcohol while you are taking these medications.     Opioid pain medications can cause addiction. If you have a history of chemical dependency of any type, you are at a higher risk of becoming addicted to pain medications.  Only take these prescribed medications to treat your pain when all other options have been tried. Take it for as short a time and as few doses as possible. Store your pain pills in a secure place, as they are frequently stolen and provide a dangerous opportunity for children or visitors in your house to start abusing these powerful medications. We will not replace any lost or stolen medicine.  As soon as your pain is better, you should flush all your remaining medication.     Many prescription pain medications contain Tylenol  (acetaminophen), including Vicodin , Tylenol #3 , Norco , Lortab , and Percocet .  You should not take any extra pills of Tylenol  if you are using these prescription medications or you can get very sick.  Do not ever take more than 3000 mg of acetaminophen in any 24 hour period.    All opioids tend to  cause constipation. Drink plenty of water and eat foods that have a lot of fiber, such as fruits, vegetables, prune juice, apple juice and high fiber cereal.  Take a laxative if you don t move your bowels at least every other day. Miralax , Milk of Magnesia, Colace , or Senna  can be used to keep you regular.      Remember that you can always come back to the Emergency Department if you are not able to see your regular doctor in the amount of time listed above, if you get any new symptoms, or if there is anything that worries you.

## 2017-10-20 NOTE — ED AVS SNAPSHOT
[facility] Emergency Department    [address] [place] [address]    Phone:  [phone]    Fax:  [phone]                                       Mary Boykin   MRN: [id number]    Department:  [facility] Emergency Department   Date of Visit:  10/20/2017           Patient Information     Date Of Birth          1935        Your diagnoses for this visit were:     Dysuria     Acute conjunctivitis of right eye, unspecified acute conjunctivitis type     Cough        You were seen by Damon Berrios MD.      Follow-up Information     Follow up with Bogdan Hernandez In 3 days.    Specialty:  Family Practice    Why:  As needed    Contact information:    [facility]  [address] [place] [address]  [phone]          Discharge Instructions         What Is Conjunctivitis?    Conjunctivitis is an irritation or infection. It affects the membrane that covers the white of your eye and the inside of your eyelid (conjunctiva). It can happen to one or both eyes. The membrane swells and the blood vessels enlarge (dilate). This makes your eye red. That's why conjunctivitis is sometimes called red eye or pink eye.  What are the symptoms?  If you have one or more of these symptoms, see an eye doctor:    Redness in and around your eye    Eyes that are puffy and sore    Itching, burning, or stinging eyes    Watery eyes or discharge from your eye    Eyelids that are crusty or stuck together when you wake up in the morning    Pink color in the whites of one or both eyes  Getting treatment quickly can help prevent damage to your eyes.  How is it diagnosed?  Conjunctivitis is usually a minor eye infection. But it can sometimes become a more serious problem. Some more serious eye diseases have symptoms that look like conjunctivitis. So it's important for an eye doctor to diagnose you. Your eye doctor will ask about your symptoms and any medicines you take. He or she  will ask about any illnesses or medical conditions you may have. The doctor will also check your eyes with a hand-held light and a special microscope called a slit lamp.  Date Last Reviewed: 6/11/2015 2000-2017 [employer]. [address] [place] [address], [place] [address]. All rights reserved. This information is not intended as a substitute for professional medical care. Always follow your healthcare professional's instructions.      Discharge Instructions  Urinary Tract Infection  You have urinary tract infection, or UTI. The urinary tract includes the kidneys (which make urine), ureters (the tubes that carry urine from the kidneys to the bladder), the bladder (which stores urine), and urethra (the tube that carries urine out of the bladder).  Urinary tract infections occur when bacteria travel up the urethra into the bladder. We suspect a UTI based on chemical and microscopic findings in your urine, but if there is a question about your findings, we will do a culture to see if bacteria grow. A urine culture takes several days. You should always follow-up with your primary physician to find out about results of your culture if one was done.   Return to the Emergency Department if:    You have severe back pain.    You are vomiting so that you can t take your medicine, or have signs of dehydration (such as urinating less than 3 times per day).    You have fever over 101.5 degrees F.    You have significant confusion or are very weak, or feel very ill.    Your child seems much more ill, won t wake up, won t respond right, or is crying for a long time and won t calm down.    Your child is showing signs of dehydration, Signs of dehydration can be:  o Your infant has had no wet diapers in 4-5 hours.  o Your older child has not passed urine in 6-8 hours.  o Your infant or child starts to have dry mouth and lips, or no saliva or tears.    Follow-up with your doctor:     Children under 24 months need to be seen  by their regular doctor within one week after a diagnosis of a UTI. It may be necessary to do some imaging tests to look at the child s kidney or bladder.    You should begin to feel better within 24 - 48 hours of starting your antibiotic.  If you do not, you need to be seen again.      Treatment:     You will be treated with an antibiotic to kill the bacteria. We have to make an educated guess as to which antibiotic will work for your infection. In most healthy people, we can guess right almost all of the time. Sometimes a culture is done to show which antibiotics will work. This usually takes 2-3 days. When the culture is done, we may have to contact you to put you on a different antibiotic.    Take a pain medication such as Tylenol  (acetaminophen), Advil  (ibuprofen), Nuprin  (ibuprofen), or Aleve  (naproxen). If you have been given a narcotic such as Vicodin  (hydrocodone with acetaminophen), Percocet  (oxycodone with acetaminophen), or codeine, do not drive for four hours after you have taken it. If the narcotic contains Tylenol  (acetaminophen), do not take Tylenol  with it. All narcotics will cause constipation, so eat a high fiber diet.      Pyridium  (phenazopyridine) or Uristat  (phenazopyridine) is a prescription medication that numbs the bladder to reduce the burning pain of some UTIs.  The same medication is available in a non-prescription version called Azo-Standard  (phenazopyridine), Urodol  (phenazopyridine), or other brand names. This medication will change the color of the urine and tears (usually blue or orange). If you wear contacts, do not wear them while taking this medication as they may be stained by the medication.    Antibiotic Warning:     If you have been placed on antibiotics - watch for signs of allergic reaction.  These include rash, lip swelling, difficulty breathing, wheezing, and dizziness.  If you develop any of these symptoms, stop the antibiotic immediately and go to an  "emergency room or urgent care for evaluation.    Probiotics: If you have been given an antibiotic, you may want to also take a probiotic pill or eat yogurt with live cultures. Probiotics have \"good bacteria\" to help your intestines stay healthy. Studies have shown that probiotics help prevent diarrhea and other intestine problems (including C. diff infection) when you take antibiotics. You can buy these without a prescription in the pharmacy section of the store.   If you were given a prescription for medicine here today, be sure to read all of the information (including the package insert) that comes with your prescription.  This will include important information about the medicine, its side effects, and any warnings that you need to know about.  The pharmacist who fills the prescription can provide more information and answer questions you may have about the medicine.  If you have questions or concerns that the pharmacist cannot address, please call or return to the Emergency Department.   Opioid Medication Information    Pain medications are among the most commonly prescribed medicines, so we are including this information for all our patients. If you did not receive pain medication or get a prescription for pain medicine, you can ignore it.     You may have been given a prescription for an opioid (narcotic) pain medicine and/or have received a pain medicine while here in the Emergency Department. These medicines can make you drowsy or impaired. You must not drive, operate dangerous equipment, or engage in any other dangerous activities while taking these medications. If you drive while taking these medications, you could be arrested for DUI, or driving under the influence. Do not drink any alcohol while you are taking these medications.     Opioid pain medications can cause addiction. If you have a history of chemical dependency of any type, you are at a higher risk of becoming addicted to pain medications.  " Only take these prescribed medications to treat your pain when all other options have been tried. Take it for as short a time and as few doses as possible. Store your pain pills in a secure place, as they are frequently stolen and provide a dangerous opportunity for children or visitors in your house to start abusing these powerful medications. We will not replace any lost or stolen medicine.  As soon as your pain is better, you should flush all your remaining medication.     Many prescription pain medications contain Tylenol  (acetaminophen), including Vicodin , Tylenol #3 , Norco , Lortab , and Percocet .  You should not take any extra pills of Tylenol  if you are using these prescription medications or you can get very sick.  Do not ever take more than 3000 mg of acetaminophen in any 24 hour period.    All opioids tend to cause constipation. Drink plenty of water and eat foods that have a lot of fiber, such as fruits, vegetables, prune juice, apple juice and high fiber cereal.  Take a laxative if you don t move your bowels at least every other day. Miralax , Milk of Magnesia, Colace , or Senna  can be used to keep you regular.      Remember that you can always come back to the Emergency Department if you are not able to see your regular doctor in the amount of time listed above, if you get any new symptoms, or if there is anything that worries you.        24 Hour Appointment Hotline       To make an appointment at any [facility], call [phone] ([phone]). If you don't have a family doctor or clinic, we will help you find one. [facility] are conveniently located to serve the needs of you and your family.             Review of your medicines      START taking        Dose / Directions Last dose taken    cephalexin 250 MG capsule   Commonly known as:  KEFLEX   Dose:  250 mg   Quantity:  15 capsule        Take 1 capsule (250 mg) by mouth 3 times daily for 5 days   Refills:  0         naphazoline-pheniramine Soln ophthalmic solution   Commonly known as:  OPCON-A/VISINE A/NAPHCON A   Dose:  1 drop   Quantity:  1 Bottle        Place 1 drop into the right eye 4 times daily as needed for irritation   Refills:  0        ofloxacin 0.3 % ophthalmic solution   Commonly known as:  OCUFLOX   Dose:  1 drop   Quantity:  1 Bottle        Apply 1 drop to eye every 4 hours   Refills:  0          Our records show that you are taking the medicines listed below. If these are incorrect, please call your family doctor or clinic.        Dose / Directions Last dose taken    ALLOPURINOL PO   Dose:  100 mg        Take 100 mg by mouth daily   Refills:  0        amLODIPine 5 MG tablet   Commonly known as:  NORVASC   Dose:  5 mg   Quantity:  30 tablet        Take 1 tablet (5 mg) by mouth daily   Refills:  0        DOXAZOSIN MESYLATE PO   Dose:  8 mg        Take 8 mg by mouth every evening   Refills:  0        HYDRALAZINE HCL PO   Dose:  10 mg        Take 10 mg by mouth 2 times daily   Refills:  0        LASIX PO   Dose:  40 mg        Take 40 mg by mouth 2 times daily   Refills:  0        Multi-vitamin Tabs tablet   Dose:  1 tablet        Take 1 tablet by mouth daily   Refills:  0        ondansetron 4 MG tablet   Commonly known as:  ZOFRAN   Dose:  4 mg   Quantity:  20 tablet        Take 1 tablet (4 mg) by mouth every 6 hours as needed for nausea   Refills:  0        psyllium 0.52 G capsule   Dose:  1 capsule        Take 1 capsule by mouth daily   Refills:  0        ROSUVASTATIN CALCIUM PO   Dose:  10 mg        Take 10 mg by mouth every evening   Refills:  0        SYNTHROID PO   Dose:  25 mcg        Take 25 mcg by mouth daily   Refills:  0        VITAMIN D (CHOLECALCIFEROL) PO   Dose:  2000 Units        Take 2,000 Units by mouth daily   Refills:  0                Prescriptions were sent or printed at these locations (3 Prescriptions)                   Other Prescriptions                Printed at Department/Unit printer (3  of 3)         cephalexin (KEFLEX) 250 MG capsule               ofloxacin (OCUFLOX) 0.3 % ophthalmic solution               naphazoline-pheniramine (OPCON-A/VISINE A/NAPHCON A) SOLN ophthalmic solution                Procedures and tests performed during your visit     CBC with platelets differential    Comprehensive metabolic panel    EKG 12 lead    IV access    UA with Microscopic    Urine Culture Aerobic Bacterial    XR Chest 2 Views      Orders Needing Specimen Collection     None      Pending Results     Date and Time Order Name Status Description    10/20/2017 1434 Urine Culture Aerobic Bacterial In process             Pending Culture Results     Date and Time Order Name Status Description    10/20/2017 1434 Urine Culture Aerobic Bacterial In process             Pending Results Instructions     If you had any lab results that were not finalized at the time of your Discharge, you can call the ED Lab Result RN at [phone]. You will be contacted by this team for any positive Lab results or changes in treatment. The nurses are available 7 days a week from 10A to 6:30P.  You can leave a message 24 hours per day and they will return your call.        Test Results From Your Hospital Stay        10/20/2017  2:32 PM      Component Results     Component Value Ref Range & Units Status    WBC 7.5 4.0 - 11.0 10e9/L Final    RBC Count 3.35 (L) 3.8 - 5.2 10e12/L Final    Hemoglobin 10.5 (L) 11.7 - 15.7 g/dL Final    Hematocrit 32.3 (L) 35.0 - 47.0 % Final    MCV 96 78 - 100 fl Final    MCH 31.3 26.5 - 33.0 pg Final    MCHC 32.5 31.5 - 36.5 g/dL Final    RDW 14.6 10.0 - 15.0 % Final    Platelet Count 136 (L) 150 - 450 10e9/L Final    Diff Method Automated Method  Final    % Neutrophils 70.4 % Final    % Lymphocytes 20.1 % Final    % Monocytes 7.1 % Final    % Eosinophils 1.2 % Final    % Basophils 0.4 % Final    % Immature Granulocytes 0.8 % Final    Nucleated RBCs 0 0 /100 Final    Absolute Neutrophil 5.3 1.6 - 8.3 10e9/L  Final    Absolute Lymphocytes 1.5 0.8 - 5.3 10e9/L Final    Absolute Monocytes 0.5 0.0 - 1.3 10e9/L Final    Absolute Eosinophils 0.1 0.0 - 0.7 10e9/L Final    Absolute Basophils 0.0 0.0 - 0.2 10e9/L Final    Abs Immature Granulocytes 0.1 0 - 0.4 10e9/L Final    Absolute Nucleated RBC 0.0  Final         10/20/2017  2:54 PM      Component Results     Component Value Ref Range & Units Status    Sodium 133 133 - 144 mmol/L Final    Potassium 3.5 3.4 - 5.3 mmol/L Final    Chloride 98 94 - 109 mmol/L Final    Carbon Dioxide 26 20 - 32 mmol/L Final    Anion Gap 9 3 - 14 mmol/L Final    Glucose 100 (H) 70 - 99 mg/dL Final    Urea Nitrogen 78 (H) 7 - 30 mg/dL Final    Creatinine 4.17 (H) 0.52 - 1.04 mg/dL Final    GFR Estimate 10 (L) >60 mL/min/1.7m2 Final    Non  GFR Calc    GFR Estimate If Black 12 (L) >60 mL/min/1.7m2 Final    African American GFR Calc    Calcium 9.2 8.5 - 10.1 mg/dL Final    Bilirubin Total 0.5 0.2 - 1.3 mg/dL Final    Albumin 3.6 3.4 - 5.0 g/dL Final    Protein Total 7.9 6.8 - 8.8 g/dL Final    Alkaline Phosphatase 83 40 - 150 U/L Final    ALT 22 0 - 50 U/L Final    AST 27 0 - 45 U/L Final         10/20/2017  3:08 PM      Component Results     Component Value Ref Range & Units Status    Color Urine Mary  Final    Appearance Urine Cloudy  Final    Glucose Urine Negative NEG^Negative mg/dL Final    Bilirubin Urine Negative NEG^Negative Final    Ketones Urine Negative NEG^Negative mg/dL Final    Specific Gravity Urine 1.011 1.003 - 1.035 Final    Blood Urine Small (A) NEG^Negative Final    pH Urine 8.0 (H) 5.0 - 7.0 pH Final    Protein Albumin Urine >499 (A) NEG^Negative mg/dL Final    NO FAT SEEN    Urobilinogen mg/dL 0.0 0.0 - 2.0 mg/dL Final    Nitrite Urine Negative NEG^Negative Final    Leukocyte Esterase Urine Large (A) NEG^Negative Final    Source Catheterized Urine  Final    WBC Urine >182 (H) 0 - 2 /HPF Final    RBC Urine 24 (H) 0 - 2 /HPF Final    WBC Clumps Present (A)  NEG^Negative /HPF Final    Bacteria Urine Moderate (A) NEG^Negative /HPF Final    Squamous Epithelial /HPF Urine 11 (H) 0 - 1 /HPF Final    Mucous Urine Present (A) NEG^Negative /LPF Final    Uric Acid Crystals Few (A) NEG^Negative /HPF Final         10/20/2017  3:08 PM      Narrative     XR CHEST 2 VW 10/20/2017 3:03 PM    HISTORY: Short of breath.    COMPARISON: 8/22/2017    FINDINGS: Streaky retrocardiac opacity with subtle correlate in the  lower lobe on the lateral view possibly representing pneumonia. The  lungs are otherwise clear. Cardiomegaly is unchanged.        Impression     IMPRESSION: Possible left lower lobe pneumonia.    MARIXA PATIÑO MD         10/20/2017  2:40 PM                Clinical Quality Measure: Blood Pressure Screening     Your blood pressure was checked while you were in the emergency department today. The last reading we obtained was  BP: 150/73 . Please read the guidelines below about what these numbers mean and what you should do about them.  If your systolic blood pressure (the top number) is less than 120 and your diastolic blood pressure (the bottom number) is less than 80, then your blood pressure is normal. There is nothing more that you need to do about it.  If your systolic blood pressure (the top number) is 120-139 or your diastolic blood pressure (the bottom number) is 80-89, your blood pressure may be higher than it should be. You should have your blood pressure rechecked within a year by a primary care provider.  If your systolic blood pressure (the top number) is 140 or greater or your diastolic blood pressure (the bottom number) is 90 or greater, you may have high blood pressure. High blood pressure is treatable, but if left untreated over time it can put you at risk for heart attack, stroke, or kidney failure. You should have your blood pressure rechecked by a primary care provider within the next 4 weeks.  If your provider in the emergency department today gave you  "specific instructions to follow-up with your doctor or provider even sooner than that, you should follow that instruction and not wait for up to 4 weeks for your follow-up visit.        Thank you for choosing [facility]       Thank you for choosing [facility] for your care. Our goal is always to provide you with excellent care. Hearing back from our patients is one way we can continue to improve our services. Please take a few minutes to complete the written survey that you may receive in the mail after you visit with us. Thank you!        [employer] Information     [employer] lets you send messages to your doctor, view your test results, renew your prescriptions, schedule appointments and more. To sign up, go to [email or web address] . Click on \"Log in\" on the left side of the screen, which will take you to the Welcome page. Then click on \"Sign up Now\" on the right side of the page.     You will be asked to enter the access code listed below, as well as some personal information. Please follow the directions to create your username and password.     Your access code is: [email or web address]  Expires: 2017 10:04 AM     Your access code will  in 90 days. If you need help or a new code, please call your [facility] clinic or [phone].        Care EveryWhere ID     This is your Care EveryWhere ID. This could be used by other organizations to access your [facility] medical records  [id number]        Equal Access to Services     MONSE GILLIAM : Hadii cruzito Osborn, waaxda paz, qaybta kaalmaignacio sheets . So [facility] [phone].    ATENCIÓN: Si habla español, tiene a wyman disposición servicios gratuitos de asistencia lingüística. Llame al [phone].    We comply with applicable federal civil rights laws and [place] laws. We do not discriminate on the basis of race, color, national origin, age, disability, sex, sexual orientation, or gender identity.            After " Visit Summary       This is your record. Keep this with you and show to your community pharmacist(s) and doctor(s) at your next visit.

## 2017-10-20 NOTE — ED NOTES
Patient complaining of URI worsening since Monday.  States she has been unable to sleep secondary to symptoms.  Also has vomiting and nausea.  Diarrhea has resolved.      ABCs intact.  Alert and oriented x 3.

## 2017-10-21 LAB
BACTERIA SPEC CULT: ABNORMAL
Lab: ABNORMAL
SPECIMEN SOURCE: ABNORMAL

## 2017-10-23 LAB — INTERPRETATION ECG - MUSE: NORMAL
